# Patient Record
Sex: FEMALE | URBAN - METROPOLITAN AREA
[De-identification: names, ages, dates, MRNs, and addresses within clinical notes are randomized per-mention and may not be internally consistent; named-entity substitution may affect disease eponyms.]

---

## 2024-01-03 ENCOUNTER — DOCUMENTATION (OUTPATIENT)
Dept: HEMATOLOGY ONCOLOGY | Facility: CLINIC | Age: 69
End: 2024-01-03

## 2024-01-03 NOTE — PROGRESS NOTES
Intake received, chart reviewed for need of external records.  Consulting: Dr. Chilel  Scheduled on 01/16/2024  Dx: melanoma   ICD: C43.72    Pathology requested:   From [facility] via fax [phone]-, phone   Accession #   Slides will be sent directly to [facility] Pathology lab at  [address].     Images requested:  From n/a via fax , phone   If not uploaded electronically via [email or web address], disks will be sent directly to the Radiology Reading Room.     Routed to consulting providers team.

## 2024-01-08 ENCOUNTER — LAB REQUISITION (OUTPATIENT)
Dept: LAB | Facility: HOSPITAL | Age: 69
End: 2024-01-08
Payer: COMMERCIAL

## 2024-01-08 DIAGNOSIS — C43.72 MALIGNANT MELANOMA OF LEFT LOWER LIMB, INCLUDING HIP (HCC): ICD-10-CM

## 2024-01-09 PROCEDURE — 88321 CONSLTJ&REPRT SLD PREP ELSWR: CPT | Performed by: STUDENT IN AN ORGANIZED HEALTH CARE EDUCATION/TRAINING PROGRAM

## 2024-01-12 PROBLEM — C43.72 MALIGNANT MELANOMA OF LEFT FOOT (HCC): Status: ACTIVE | Noted: 2024-01-12

## 2024-01-16 ENCOUNTER — CONSULT (OUTPATIENT)
Dept: SURGICAL ONCOLOGY | Facility: CLINIC | Age: 69
End: 2024-01-16
Payer: COMMERCIAL

## 2024-01-16 VITALS
HEIGHT: 65 IN | WEIGHT: 176.5 LBS | TEMPERATURE: 97.4 F | DIASTOLIC BLOOD PRESSURE: 64 MMHG | HEART RATE: 108 BPM | RESPIRATION RATE: 15 BRPM | OXYGEN SATURATION: 97 % | BODY MASS INDEX: 29.41 KG/M2 | SYSTOLIC BLOOD PRESSURE: 116 MMHG

## 2024-01-16 DIAGNOSIS — C43.72 MALIGNANT MELANOMA OF LEFT FOOT (HCC): Primary | ICD-10-CM

## 2024-01-16 PROCEDURE — 99204 OFFICE O/P NEW MOD 45 MIN: CPT | Performed by: STUDENT IN AN ORGANIZED HEALTH CARE EDUCATION/TRAINING PROGRAM

## 2024-01-16 RX ORDER — LOSARTAN POTASSIUM 50 MG/1
50 TABLET ORAL DAILY
COMMUNITY
Start: 2024-01-11

## 2024-01-16 RX ORDER — IBUPROFEN 600 MG/1
TABLET ORAL AS NEEDED
COMMUNITY
Start: 2023-10-19

## 2024-01-16 RX ORDER — ESCITALOPRAM OXALATE 10 MG/1
10 TABLET ORAL DAILY
COMMUNITY
Start: 2024-01-11

## 2024-01-16 RX ORDER — HYDROCHLOROTHIAZIDE 12.5 MG/1
12.5 TABLET ORAL DAILY
COMMUNITY
Start: 2024-01-11

## 2024-01-16 RX ORDER — TRIAMTERENE AND HYDROCHLOROTHIAZIDE 37.5; 25 MG/1; MG/1
CAPSULE ORAL DAILY
COMMUNITY

## 2024-01-16 RX ORDER — SIMVASTATIN 20 MG
20 TABLET ORAL DAILY
COMMUNITY
Start: 2024-01-11

## 2024-01-16 RX ORDER — ZOLPIDEM TARTRATE 12.5 MG/1
12.5 TABLET, FILM COATED, EXTENDED RELEASE ORAL AS NEEDED
COMMUNITY
Start: 2024-01-11

## 2024-01-16 RX ORDER — HYDROCHLOROTHIAZIDE 12.5 MG/1
CAPSULE, GELATIN COATED ORAL DAILY
COMMUNITY

## 2024-01-16 NOTE — PROGRESS NOTES
Surgical Oncology Consultation    [address] [facility]  [facility] [place]  [address] [facility] [address]    Patient:  Lisa Ramirez  1955  [id number]    Primary Care provider:  Ryanne Tariq MD  [address] [facility]  [place] [address]    Referring provider:  No referring provider defined for this encounter.    Diagnoses and all orders for this visit:    Malignant melanoma of left foot (HCC)  -     Case request operating room: WIDE EXCISION OF MELANOMA OF LEFT FOOT, BIOPSY LYMPH NODE SENTINEL; Standing  -     NM lymphatic melanoma; Future  -     CBC and differential; Future  -     Comprehensive metabolic panel; Future  -     EKG 12 lead; Future  -     XR chest pa & lateral; Future  -     Ambulatory Referral to Plastic Surgery; Future  -     Case request operating room: WIDE EXCISION OF MELANOMA OF LEFT FOOT, BIOPSY LYMPH NODE SENTINEL    Other orders  -     tretinoin (Retin-A) 0.025 % cream; Apply topically daily at bedtime  -     simvastatin (ZOCOR) 20 mg tablet; Take 20 mg by mouth daily  -     triamterene-hydrochlorothiazide (DYAZIDE) 37.5-25 mg per capsule; daily Daily in evening  -     zolpidem (AMBIEN CR) 12.5 MG CR tablet; Take 12.5 mg by mouth as needed  -     losartan (COZAAR) 50 mg tablet; Take 50 mg by mouth daily  -     ibuprofen (MOTRIN) 600 mg tablet; as needed  -     hydrochlorothiazide (HYDRODIURIL) 12.5 mg tablet; Take 12.5 mg by mouth daily  -     hydrochlorothiazide (MICROZIDE) 12.5 mg capsule; in the morning  -     escitalopram (LEXAPRO) 10 mg tablet; Take 10 mg by mouth daily  -     Incentive spirometry; Standing  -     Insert and maintain IV line; Standing  -     Void On-Call to O.R.; Standing  -     Place sequential compression device; Standing  -     ceFAZolin (ANCEF) 2,000 mg in dextrose 5 % 100 mL IVPB        Chief Complaint   Patient presents with    Consult       No follow-ups on file.    Oncology History    Malignant melanoma of left foot (HCC)   12/21/2023 Biopsy    A. Skin, left dorsal foot, shave biopsy:     MELANOMA (thickness: at least 0.8 mm); transected (see note).     Synoptic report for melanoma of the skin  Thickness: at least 0.8 mm (invasive melanoma broadly transected at deep specimen margin)  Ulceration: not seen  Mitoses: 2/mm2         1/12/2024 Initial Diagnosis    Malignant melanoma of left foot (HCC)         History of Present Illness  :   New at least T1b of left lateral dorsum of foot. States lesion arose over the last year and was ultimately biopsied by derm. Appeared erythematous and raised and a shave bx was performed revealing an at least T1b sam with positive deep margin. No other lumps or bumps to report. No systemic sx of bone pain, HA, weight loss. No PMH sam or non-sam skin cancer. Fair skin hair and eyes.     Review of Systems  Complete ROS Surg Onc:   Constitutional: The patient denies new or recent history of general fatigue, no recent weight loss, no change in appetite.   Eyes: No complaints of visual problems, no scleral icterus.   ENT: No complaints of ear pain, no hoarseness, no difficulty swallowing,  no tinnitus and no new masses in head, oral cavity, or neck.   Cardiovascular: No complaints of chest pain, no palpitations, no ankle edema.   Respiratory: No complaints of shortness of breath, no cough.   Gastrointestinal: No complaints of jaundice, no bloody stools, no pale stools.   Genitourinary: No complaints of dysuria, no hematuria, no nocturia, no frequent urination, no urethral discharge.   Musculoskeletal: No complaints of weakness, paralysis, joint stiffness or arthralgias.  Integumentary: No complaints of rash, no new lesions.   Neurological: No complaints of convulsions, no seizures, no dizziness.   Hematologic/Lymphatic: No complaints of easy bruising.   Endocrine:  No hot or cold intolerance.  No polydipsia, polyphagia, or polyuria.  Allergy/immunology:  No  environmental allergies.  No food allergies.  Not immunocompromised.      Patient Active Problem List   Diagnosis    Malignant melanoma of left foot (HCC)     Past Medical History:   Diagnosis Date    Anxiety     Hypertension      Past Surgical History:   Procedure Laterality Date    COLONOSCOPY      TONSILLECTOMY      UPPER GASTROINTESTINAL ENDOSCOPY       History reviewed. No pertinent family history.  Social History     Socioeconomic History    Marital status: /Civil Union     Spouse name: Not on file    Number of children: Not on file    Years of education: Not on file    Highest education level: Not on file   Occupational History    Not on file   Tobacco Use    Smoking status: Former     Average packs/day: 1 pack/day for 52.0 years (50.0 ttl pk-yrs)     Types: Cigarettes     Start date: 1970     Quit date:      Years since quittin.0    Smokeless tobacco: Never   Vaping Use    Vaping status: Never Used   Substance and Sexual Activity    Alcohol use: Yes     Alcohol/week: 3.0 standard drinks of alcohol     Types: 3 Glasses of wine per week    Drug use: Never    Sexual activity: Not Currently     Partners: Male     Birth control/protection: Female Sterilization   Other Topics Concern    Not on file   Social History Narrative    Not on file     Social Determinants of Health     Financial Resource Strain: Not At Risk (10/14/2023)    Received from [facility]    Financial Resource Strain     In the last 12 months did you skip medications to save money?: No     In the last 12 months, was there a time when you needed to see a doctor but could not because of cost?: No   Food Insecurity: Not At Risk (10/14/2023)    Received from [facility]    Food Insecurity     In the last 12 months did you ever eat less than you felt you should because there wasn't enough money for food?: No   Transportation Needs: Not At Risk (10/14/2023)    Received  from [facility]    Transporation     In the last 12 months, have you ever had to go without healthcare because you didn't have a way to get there?: No   Physical Activity: Unknown (3/15/2023)    Received from [facility]    Exercise Vital Sign     Days of Exercise per Week: 3 days     Minutes of Exercise per Session: Not on file   Stress: Stress Concern Present (3/15/2023)    Received from [facility]    [employer] - Occupational Stress Questionnaire     Feeling of Stress : To some extent   Social Connections: Not At Risk (10/14/2023)    Received from [facility]    Social Connections     Do you often feel lonely?: No   Intimate Partner Violence: Not on file   Housing Stability: Not At Risk (10/14/2023)    Received from [facility]    Housing Stability     Are you worried that in the next 2 months you may not have stable housing?: No       Current Outpatient Medications:     escitalopram (LEXAPRO) 10 mg tablet, Take 10 mg by mouth daily, Disp: , Rfl:     hydrochlorothiazide (HYDRODIURIL) 12.5 mg tablet, Take 12.5 mg by mouth daily, Disp: , Rfl:     hydrochlorothiazide (MICROZIDE) 12.5 mg capsule, in the morning, Disp: , Rfl:     ibuprofen (MOTRIN) 600 mg tablet, as needed, Disp: , Rfl:     losartan (COZAAR) 50 mg tablet, Take 50 mg by mouth daily, Disp: , Rfl:     simvastatin (ZOCOR) 20 mg tablet, Take 20 mg by mouth daily, Disp: , Rfl:     tretinoin (Retin-A) 0.025 % cream, Apply topically daily at bedtime, Disp: , Rfl:     triamterene-hydrochlorothiazide (DYAZIDE) 37.5-25 mg per capsule, daily Daily in evening, Disp: , Rfl:     zolpidem (AMBIEN CR) 12.5 MG CR tablet, Take 12.5 mg by mouth as needed, Disp: , Rfl:   No Known Allergies    Vitals:    01/16/24 1101   BP: 116/64   Pulse: (!) 108   Resp: 15   Temp: (!) 97.4 °F (36.3 °C)   SpO2: 97%       Physical Exam    General: Appears well, appears stated age  Skin: Warm, anicteric. LFT foot dorsal scab about 8 mm in size  HEENT: Normocephalic, atraumatic; sclera aniceteric, mucous membranes moist; cervical nodes without adenopathy  Cardiopulmonary: RRR, Easy WOB, no BLE edema  Abd: Flat and soft, nontender, no masses appreciated, no hepatosplenomegaly  MSK: Symmetric, no cyanosis, no overt weakness  Lymphatic: No cervical, axillary or inguinal lymphadenopathy  Neuro: Affect appropriate, no gross motor abnormalities    Pathology:  Final Diagnosis   Consult case (9 slides [id number]  [employer], collected 12/21/2023):     A. Skin, left dorsal foot, shave biopsy:     MELANOMA (thickness: at least 0.8 mm); transected (see note).     Note: The provided immunostains were reviewed; the lesional cells are positive for SOX10, Melan-A, and PRAME. Please see synoptic report for additional details.     Synoptic report for melanoma of the skin  Thickness: at least 0.8 mm (invasive melanoma broadly transected at deep specimen margin)  Ulceration: not seen  Anatomic (Castillo) level: at least IV  Type: cannot be determined  Mitoses: 2/mm2  Microsatellites: cannot be determined   Lymphovascular invasion: not seen  Neurotropism: not seen  Tumor regression: not seen  Tumor-infiltrating lymphocytes (TIL): present, non-brisk  Margin assessment: invasive melanoma extends to peripheral and deep specimen margin  Pathologic stage: at least pT1b  Associated nevus: not seen       Labs: Reviewed in EPIC    Imaging  No results found.    I independently reviewed and interpreted the above laboratory and imaging data incl derm notes, path referral      Discussion/Summary:   67 yo female with new diagnosis of at least T1b melanoma of the left foot dorsal skin. Discussed diagnosis of melanoma and explained that the patient has an unknown thickness melanoma due to the positive deep margin for which a wide local excision and sentinel lymph node biopsy  is recommended. Discussed that any additional therapy will be guided by the final pathology and results of the LN bx. Discussed that sun protection is best prevention for melanoma and discussed ongoing sun protection. Reinforced need for continued skin surveillance with dermatology, which the patient plans to continue. Risks, benefits, alternatives and prognosis discussed in full to include bleeding, infection, wound healing complications, need for re-excision, seroma, and pt expresses understanding and endorsement. Will proceed with WLE + SLN bx.

## 2024-01-17 ENCOUNTER — APPOINTMENT (OUTPATIENT)
Dept: LAB | Facility: HOSPITAL | Age: 69
End: 2024-01-17
Payer: COMMERCIAL

## 2024-01-17 ENCOUNTER — TELEPHONE (OUTPATIENT)
Dept: SURGICAL ONCOLOGY | Facility: CLINIC | Age: 69
End: 2024-01-17

## 2024-01-17 ENCOUNTER — HOSPITAL ENCOUNTER (OUTPATIENT)
Dept: RADIOLOGY | Facility: HOSPITAL | Age: 69
Discharge: HOME | End: 2024-01-17
Payer: COMMERCIAL

## 2024-01-17 DIAGNOSIS — C43.72 MALIGNANT MELANOMA OF LEFT FOOT (HCC): ICD-10-CM

## 2024-01-17 LAB
ALBUMIN SERPL BCP-MCNC: 4.1 G/DL (ref 3.5–5)
ALP SERPL-CCNC: 56 U/L (ref 34–104)
ALT SERPL W P-5'-P-CCNC: 12 U/L (ref 7–52)
ANION GAP SERPL CALCULATED.3IONS-SCNC: 9 MMOL/L
AST SERPL W P-5'-P-CCNC: 16 U/L (ref 13–39)
ATRIAL RATE: 96 BPM
BASOPHILS # BLD AUTO: 0.06 THOUSANDS/ÂΜL (ref 0–0.1)
BASOPHILS NFR BLD AUTO: 1 % (ref 0–1)
BILIRUB SERPL-MCNC: 0.31 MG/DL (ref 0.2–1)
BUN SERPL-MCNC: 13 MG/DL (ref 5–25)
CALCIUM SERPL-MCNC: 9 MG/DL (ref 8.4–10.2)
CHLORIDE SERPL-SCNC: 98 MMOL/L (ref 96–108)
CO2 SERPL-SCNC: 28 MMOL/L (ref 21–32)
CREAT SERPL-MCNC: 0.93 MG/DL (ref 0.6–1.3)
EOSINOPHIL # BLD AUTO: 0.1 THOUSAND/ÂΜL (ref 0–0.61)
EOSINOPHIL NFR BLD AUTO: 2 % (ref 0–6)
ERYTHROCYTE [DISTWIDTH] IN BLOOD BY AUTOMATED COUNT: 15 % (ref 11.6–15.1)
GFR SERPL CREATININE-BSD FRML MDRD: 63 ML/MIN/1.73SQ M
GLUCOSE P FAST SERPL-MCNC: 96 MG/DL (ref 65–99)
HCT VFR BLD AUTO: 35.1 % (ref 34.8–46.1)
HGB BLD-MCNC: 10.6 G/DL (ref 11.5–15.4)
IMM GRANULOCYTES # BLD AUTO: 0.03 THOUSAND/UL (ref 0–0.2)
IMM GRANULOCYTES NFR BLD AUTO: 0 % (ref 0–2)
LYMPHOCYTES # BLD AUTO: 2.27 THOUSANDS/ÂΜL (ref 0.6–4.47)
LYMPHOCYTES NFR BLD AUTO: 34 % (ref 14–44)
MCH RBC QN AUTO: 25.3 PG (ref 26.8–34.3)
MCHC RBC AUTO-ENTMCNC: 30.2 G/DL (ref 31.4–37.4)
MCV RBC AUTO: 84 FL (ref 82–98)
MONOCYTES # BLD AUTO: 0.54 THOUSAND/ÂΜL (ref 0.17–1.22)
MONOCYTES NFR BLD AUTO: 8 % (ref 4–12)
NEUTROPHILS # BLD AUTO: 3.78 THOUSANDS/ÂΜL (ref 1.85–7.62)
NEUTS SEG NFR BLD AUTO: 55 % (ref 43–75)
NRBC BLD AUTO-RTO: 0 /100 WBCS
P AXIS: 0 DEGREES
PLATELET # BLD AUTO: 360 THOUSANDS/UL (ref 149–390)
PMV BLD AUTO: 10 FL (ref 8.9–12.7)
POTASSIUM SERPL-SCNC: 3.2 MMOL/L (ref 3.5–5.3)
PR INTERVAL: 158 MS
PROT SERPL-MCNC: 7.2 G/DL (ref 6.4–8.4)
QRS AXIS: 17 DEGREES
QRSD INTERVAL: 86 MS
QT INTERVAL: 358 MS
QTC INTERVAL: 452 MS
RBC # BLD AUTO: 4.19 MILLION/UL (ref 3.81–5.12)
SODIUM SERPL-SCNC: 135 MMOL/L (ref 135–147)
T WAVE AXIS: 22 DEGREES
VENTRICULAR RATE: 96 BPM
WBC # BLD AUTO: 6.78 THOUSAND/UL (ref 4.31–10.16)

## 2024-01-17 PROCEDURE — 71046 X-RAY EXAM CHEST 2 VIEWS: CPT

## 2024-01-17 PROCEDURE — 80053 COMPREHEN METABOLIC PANEL: CPT

## 2024-01-17 PROCEDURE — 36415 COLL VENOUS BLD VENIPUNCTURE: CPT

## 2024-01-17 PROCEDURE — 85025 COMPLETE CBC W/AUTO DIFF WBC: CPT

## 2024-01-17 NOTE — TELEPHONE ENCOUNTER
Called and spoke with patient in regards to her surgery date. Patient excepted a surgery date of 2/23/24 at the [facility] location with Dr Chilel and Dr Sutton. Patient has no further questions at this time.

## 2024-01-29 ENCOUNTER — CONSULT (OUTPATIENT)
Dept: PLASTIC SURGERY | Facility: CLINIC | Age: 69
End: 2024-01-29
Payer: COMMERCIAL

## 2024-01-29 VITALS
HEIGHT: 65 IN | BODY MASS INDEX: 29.32 KG/M2 | HEART RATE: 108 BPM | SYSTOLIC BLOOD PRESSURE: 119 MMHG | DIASTOLIC BLOOD PRESSURE: 86 MMHG | TEMPERATURE: 98.1 F | WEIGHT: 176 LBS

## 2024-01-29 DIAGNOSIS — C43.72 MALIGNANT MELANOMA OF LEFT FOOT (HCC): Primary | ICD-10-CM

## 2024-01-29 PROCEDURE — 99204 OFFICE O/P NEW MOD 45 MIN: CPT | Performed by: STUDENT IN AN ORGANIZED HEALTH CARE EDUCATION/TRAINING PROGRAM

## 2024-01-30 ENCOUNTER — TELEPHONE (OUTPATIENT)
Dept: PLASTIC SURGERY | Facility: CLINIC | Age: 69
End: 2024-01-30

## 2024-01-30 NOTE — TELEPHONE ENCOUNTER
Left message for patient letting her know that her surgery is all scheduled and left my direct number to call me if she has any questions.

## 2024-01-30 NOTE — PROGRESS NOTES
Plastic Surgery Consult    Reason for visit: left dorsal foot melanoma scheduled for WLE    HPI from 24  Patient is a 67 y/o female who presents with biopsy proven melanoma (at least 0.8 mm thick from shave biopsy) of the left lateral dorsal foot. She is planned for WLE and SLNbx with Dr Chilel. She presents today for surgical reconstructive options and treatment.    ROS: 12 pt ROS negative, except as otherwise noted in HPI    Past Medical History:   Diagnosis Date    Anxiety     Hypertension        FamHx: non-contrib  Past Surgical History:   Procedure Laterality Date    COLONOSCOPY      TONSILLECTOMY      UPPER GASTROINTESTINAL ENDOSCOPY         Social History     Socioeconomic History    Marital status: /Civil Union     Spouse name: Not on file    Number of children: Not on file    Years of education: Not on file    Highest education level: Not on file   Occupational History    Not on file   Tobacco Use    Smoking status: Former     Average packs/day: 2.0 packs/day for 25.0 years (50.0 ttl pk-yrs)     Types: Cigarettes     Start date: 1970     Quit date:      Years since quittin.1    Smokeless tobacco: Never   Vaping Use    Vaping status: Never Used   Substance and Sexual Activity    Alcohol use: Yes     Alcohol/week: 3.0 standard drinks of alcohol     Types: 3 Glasses of wine per week    Drug use: Never    Sexual activity: Not Currently     Partners: Male     Birth control/protection: Female Sterilization   Other Topics Concern    Not on file   Social History Narrative    Not on file     Social Determinants of Health     Financial Resource Strain: Not At Risk (10/14/2023)    Received from [facility]    Financial Resource Strain     In the last 12 months did you skip medications to save money?: No     In the last 12 months, was there a time when you needed to see a doctor but could not because of cost?: No   Food Insecurity: Not At Risk (10/14/2023)     Received from [facility]    Food Insecurity     In the last 12 months did you ever eat less than you felt you should because there wasn't enough money for food?: No   Transportation Needs: Not At Risk (10/14/2023)    Received from [facility]    Transporation     In the last 12 months, have you ever had to go without healthcare because you didn't have a way to get there?: No   Physical Activity: Unknown (3/15/2023)    Received from [facility]    Exercise Vital Sign     Days of Exercise per Week: 3 days     Minutes of Exercise per Session: Not on file   Stress: Stress Concern Present (3/15/2023)    Received from [facility]    [employer] [place] [employer] - Occupational Stress Questionnaire     Feeling of Stress : To some extent   Social Connections: Not At Risk (10/14/2023)    Received from [facility]    Social Connections     Do you often feel lonely?: No   Intimate Partner Violence: Not on file   Housing Stability: Not At Risk (10/14/2023)    Received from [facility]    Housing Stability     Are you worried that in the next 2 months you may not have stable housing?: No       Current Outpatient Medications on File Prior to Visit   Medication Sig Dispense Refill    escitalopram (LEXAPRO) 10 mg tablet Take 10 mg by mouth daily      hydrochlorothiazide (HYDRODIURIL) 12.5 mg tablet Take 12.5 mg by mouth daily      hydrochlorothiazide (MICROZIDE) 12.5 mg capsule in the morning      ibuprofen (MOTRIN) 600 mg tablet as needed      losartan (COZAAR) 50 mg tablet Take 50 mg by mouth daily      metFORMIN HCl  MG/5ML SRER       simvastatin (ZOCOR) 20 mg tablet Take 20 mg by mouth daily      tretinoin (Retin-A) 0.025 % cream Apply topically daily at bedtime      triamterene-hydrochlorothiazide (DYAZIDE) 37.5-25 mg per capsule daily Daily in evening       zolpidem (AMBIEN CR) 12.5 MG CR tablet Take 12.5 mg by mouth as needed       No current facility-administered medications on file prior to visit.       No Known Allergies      PE:    Vitals:    01/29/24 1500   BP: 119/86   Pulse: (!) 108   Temp: 98.1 °F (36.7 °C)       General: NC/AT, breathing comfortably on RA  Neuro: CN II-XII grossly intact, symmetric reflexes  HEENT: PERRLA, EOMI, external ears normal, no lesions or deformities, neck supple, trachea midline  Respiratory: CTAB, normal respiratory effort  Cardio: RRR, normal S1, S2, no murmur, rubs, gallops  GI: soft, non-tender, non-distended  Extremities/MSK: normal alignment, mobility, gait, no edema    Left lateral dorsal foot melanocytic lesion with asymmetric borders    Biopsy: melanoma (at least 0.8 mm) from shave biopsy    A/P: 67 y/o female who presents with left lateral dorsal foot melanoma with plan for WLE and SLNbx.  -Discussed the various methods of reconstruction, most likely being split-thickness skin graft with VAC bolster vs manual tie-over bolster. I discussed that the split-thickness skin graft will be obtained from the left thigh. I discussed the donor site maintenance and postoperative dressing. I discussed that the skin graft would be bolstered either with VAC or tie-over bolster depending on the size of the defect. More over, the area of resection is overlying the extensor tendons. I discussed with patient that if peritenon is intact, that I can proceed with skin graft. However, if peritenon is resected as well, then I would place dermal substitute integra bolstered by VAC. In the even that integra is utilized, the VAC would be changed weekly, and skin graft would be delayed for 3-4 weeks. Patient acknowledged.  -Ultimately, the method of reconstruction will depend on the size, depth, extent of the resection. Patient acknowledged.  -Discussed postoperative need for crutches and non-weight bearing on the left foot due to the possibility of  shear given the location just overlying extensor tendons. Patient acknowledged.  -All questions answered, concerns addressed.  -Consent obtained, will coordinate with Dr Chilel  -Spent 45 minutes in consultation with patient. Greater than 50% of the total time was spent obtaining history, evaluation, performing exam, discussion of management options including post-operative care, answering patient's questions and concerns, chart reviewing, and documentation    Efren Sutton MD   [facility]   [address] [place] [address], [address]   [place] [address]   Office: [phone]

## 2024-01-30 NOTE — H&P (VIEW-ONLY)
Plastic Surgery Consult    Reason for visit: left dorsal foot melanoma scheduled for WLE    HPI from 24  Patient is a 67 y/o female who presents with biopsy proven melanoma (at least 0.8 mm thick from shave biopsy) of the left lateral dorsal foot. She is planned for WLE and SLNbx with Dr Chilel. She presents today for surgical reconstructive options and treatment.    ROS: 12 pt ROS negative, except as otherwise noted in HPI    Past Medical History:   Diagnosis Date    Anxiety     Hypertension        FamHx: non-contrib  Past Surgical History:   Procedure Laterality Date    COLONOSCOPY      TONSILLECTOMY      UPPER GASTROINTESTINAL ENDOSCOPY         Social History     Socioeconomic History    Marital status: /Civil Union     Spouse name: Not on file    Number of children: Not on file    Years of education: Not on file    Highest education level: Not on file   Occupational History    Not on file   Tobacco Use    Smoking status: Former     Average packs/day: 2.0 packs/day for 25.0 years (50.0 ttl pk-yrs)     Types: Cigarettes     Start date: 1970     Quit date:      Years since quittin.1    Smokeless tobacco: Never   Vaping Use    Vaping status: Never Used   Substance and Sexual Activity    Alcohol use: Yes     Alcohol/week: 3.0 standard drinks of alcohol     Types: 3 Glasses of wine per week    Drug use: Never    Sexual activity: Not Currently     Partners: Male     Birth control/protection: Female Sterilization   Other Topics Concern    Not on file   Social History Narrative    Not on file     Social Determinants of Health     Financial Resource Strain: Not At Risk (10/14/2023)    Received from [facility]    Financial Resource Strain     In the last 12 months did you skip medications to save money?: No     In the last 12 months, was there a time when you needed to see a doctor but could not because of cost?: No   Food Insecurity: Not At Risk (10/14/2023)     Received from [facility]    Food Insecurity     In the last 12 months did you ever eat less than you felt you should because there wasn't enough money for food?: No   Transportation Needs: Not At Risk (10/14/2023)    Received from [facility]    Transporation     In the last 12 months, have you ever had to go without healthcare because you didn't have a way to get there?: No   Physical Activity: Unknown (3/15/2023)    Received from [facility]    Exercise Vital Sign     Days of Exercise per Week: 3 days     Minutes of Exercise per Session: Not on file   Stress: Stress Concern Present (3/15/2023)    Received from [facility]    [employer] - Occupational Stress Questionnaire     Feeling of Stress : To some extent   Social Connections: Not At Risk (10/14/2023)    Received from [facility]    Social Connections     Do you often feel lonely?: No   Intimate Partner Violence: Not on file   Housing Stability: Not At Risk (10/14/2023)    Received from [facility]    Housing Stability     Are you worried that in the next 2 months you may not have stable housing?: No       Current Outpatient Medications on File Prior to Visit   Medication Sig Dispense Refill    escitalopram (LEXAPRO) 10 mg tablet Take 10 mg by mouth daily      hydrochlorothiazide (HYDRODIURIL) 12.5 mg tablet Take 12.5 mg by mouth daily      hydrochlorothiazide (MICROZIDE) 12.5 mg capsule in the morning      ibuprofen (MOTRIN) 600 mg tablet as needed      losartan (COZAAR) 50 mg tablet Take 50 mg by mouth daily      metFORMIN HCl  MG/5ML SRER       simvastatin (ZOCOR) 20 mg tablet Take 20 mg by mouth daily      tretinoin (Retin-A) 0.025 % cream Apply topically daily at bedtime      triamterene-hydrochlorothiazide (DYAZIDE) 37.5-25 mg per capsule daily Daily in evening       zolpidem (AMBIEN CR) 12.5 MG CR tablet Take 12.5 mg by mouth as needed       No current facility-administered medications on file prior to visit.       No Known Allergies      PE:    Vitals:    01/29/24 1500   BP: 119/86   Pulse: (!) 108   Temp: 98.1 °F (36.7 °C)       General: NC/AT, breathing comfortably on RA  Neuro: CN II-XII grossly intact, symmetric reflexes  HEENT: PERRLA, EOMI, external ears normal, no lesions or deformities, neck supple, trachea midline  Respiratory: CTAB, normal respiratory effort  Cardio: RRR, normal S1, S2, no murmur, rubs, gallops  GI: soft, non-tender, non-distended  Extremities/MSK: normal alignment, mobility, gait, no edema    Left lateral dorsal foot melanocytic lesion with asymmetric borders    Biopsy: melanoma (at least 0.8 mm) from shave biopsy    A/P: 69 y/o female who presents with left lateral dorsal foot melanoma with plan for WLE and SLNbx.  -Discussed the various methods of reconstruction, most likely being split-thickness skin graft with VAC bolster vs manual tie-over bolster. I discussed that the split-thickness skin graft will be obtained from the left thigh. I discussed the donor site maintenance and postoperative dressing. I discussed that the skin graft would be bolstered either with VAC or tie-over bolster depending on the size of the defect. More over, the area of resection is overlying the extensor tendons. I discussed with patient that if peritenon is intact, that I can proceed with skin graft. However, if peritenon is resected as well, then I would place dermal substitute integra bolstered by VAC. In the even that integra is utilized, the VAC would be changed weekly, and skin graft would be delayed for 3-4 weeks. Patient acknowledged.  -Ultimately, the method of reconstruction will depend on the size, depth, extent of the resection. Patient acknowledged.  -Discussed postoperative need for crutches and non-weight bearing on the left foot due to the possibility of  shear given the location just overlying extensor tendons. Patient acknowledged.  -All questions answered, concerns addressed.  -Consent obtained, will coordinate with Dr Chilel  -Spent 45 minutes in consultation with patient. Greater than 50% of the total time was spent obtaining history, evaluation, performing exam, discussion of management options including post-operative care, answering patient's questions and concerns, chart reviewing, and documentation    Efren Sutton MD   [facility]astic [facility]constructive [facility]   [address] [place] [address], [address]   [place] [address]   Office: [phone]

## 2024-02-13 ENCOUNTER — ANESTHESIA EVENT (OUTPATIENT)
Dept: PERIOP | Facility: HOSPITAL | Age: 69
End: 2024-02-13
Payer: COMMERCIAL

## 2024-02-16 LAB
DME PARACHUTE DELIVERY DATE REQUESTED: NORMAL
DME PARACHUTE ITEM DESCRIPTION: NORMAL
DME PARACHUTE ORDER STATUS: NORMAL
DME PARACHUTE SUPPLIER NAME: NORMAL
DME PARACHUTE SUPPLIER PHONE: NORMAL

## 2024-02-16 NOTE — PRE-PROCEDURE INSTRUCTIONS
Pre-Surgery Instructions:   Medication Instructions    escitalopram (LEXAPRO) 10 mg tablet Take day of surgery.    hydrochlorothiazide (HYDRODIURIL) 12.5 mg tablet Take night before surgery    ibuprofen (MOTRIN) 600 mg tablet Stop taking 7 days prior to surgery.    losartan (COZAAR) 50 mg tablet Take night before surgery    metFORMIN HCl  MG/5ML SRER Hold day of surgery.    Multiple Vitamins-Minerals (Multivitamin Adults) TABS Stop taking 7 days prior to surgery.    simvastatin (ZOCOR) 20 mg tablet Take day of surgery.    tretinoin (Retin-A) 0.025 % cream Hold day of surgery.    zolpidem (AMBIEN CR) 12.5 MG CR tablet PRN HS     Spoke with pt via phone.    Medication instructions for day surgery reviewed. Please use only a sip of water to take your instructed medications. Avoid all over the counter vitamins, supplements and NSAIDS for one week prior to surgery per anesthesia guidelines. Tylenol is ok to take as needed.     You will receive a call one business day prior to surgery with an arrival time and hospital directions. If your surgery is scheduled on a Monday, the hospital will be calling you on the Friday prior to your surgery. If you have not heard from anyone by 8pm, please call the hospital supervisor through the hospital  at [phone]. ([place] [phone] or [place] [phone]).    Do not eat or drink anything after midnight the night before your surgery, including candy, mints, lifesavers, or chewing gum. Do not drink alcohol 24hrs before your surgery. Try not to smoke at least 24hrs before your surgery.       Follow the pre surgery showering instructions as listed in the “My Surgical Experience Booklet” or otherwise provided by your surgeon's office. Do not use a blade to shave the surgical area 1 week before surgery. It is okay to use a clean electric clippers up to 24 hours before surgery. Do not apply any lotions, creams, including makeup, cologne, deodorant, or perfumes after  showering on the day of your surgery. Do not use dry shampoo, hair spray, hair gel, or any type of hair products.     No contact lenses, eye make-up, or artificial eyelashes. Remove nail polish, including gel polish, and any artificial, gel, or acrylic nails if possible. Remove all jewelry including rings and body piercing jewelry.     Wear causal clothing that is easy to take on and off. Consider your type of surgery.    Keep any valuables, jewelry, piercings at home. Please bring any specially ordered equipment (sling, braces) if indicated.    Arrange for a responsible person to drive you to and from the hospital on the day of your surgery. Visitor Guidelines discussed.     Call the surgeon's office with any new illnesses, exposures, or additional questions prior to surgery.    Please reference your “My Surgical Experience Booklet” for additional information to prepare for your upcoming surgery.

## 2024-02-23 ENCOUNTER — HOSPITAL ENCOUNTER (OUTPATIENT)
Dept: NUCLEAR MEDICINE | Facility: HOSPITAL | Age: 69
End: 2024-02-23
Attending: STUDENT IN AN ORGANIZED HEALTH CARE EDUCATION/TRAINING PROGRAM
Payer: COMMERCIAL

## 2024-02-23 ENCOUNTER — HOSPITAL ENCOUNTER (OUTPATIENT)
Facility: HOSPITAL | Age: 69
Setting detail: OUTPATIENT SURGERY
Discharge: HOME | End: 2024-02-23
Attending: STUDENT IN AN ORGANIZED HEALTH CARE EDUCATION/TRAINING PROGRAM | Admitting: STUDENT IN AN ORGANIZED HEALTH CARE EDUCATION/TRAINING PROGRAM
Payer: COMMERCIAL

## 2024-02-23 ENCOUNTER — ANESTHESIA (OUTPATIENT)
Dept: PERIOP | Facility: HOSPITAL | Age: 69
End: 2024-02-23
Payer: COMMERCIAL

## 2024-02-23 VITALS
SYSTOLIC BLOOD PRESSURE: 106 MMHG | BODY MASS INDEX: 29.46 KG/M2 | HEIGHT: 65 IN | WEIGHT: 176.8 LBS | DIASTOLIC BLOOD PRESSURE: 55 MMHG | HEART RATE: 104 BPM | TEMPERATURE: 98.5 F | OXYGEN SATURATION: 95 % | RESPIRATION RATE: 15 BRPM

## 2024-02-23 DIAGNOSIS — C43.72 MALIGNANT MELANOMA OF LEFT FOOT (HCC): ICD-10-CM

## 2024-02-23 PROBLEM — E11.9 DIABETES MELLITUS, TYPE 2 (HCC): Status: ACTIVE | Noted: 2024-02-23

## 2024-02-23 PROBLEM — G47.30 SLEEP APNEA: Status: ACTIVE | Noted: 2024-02-23

## 2024-02-23 PROBLEM — E78.5 HYPERLIPIDEMIA: Status: ACTIVE | Noted: 2024-02-23

## 2024-02-23 PROBLEM — I10 HYPERTENSION: Status: ACTIVE | Noted: 2024-02-23

## 2024-02-23 LAB — GLUCOSE SERPL-MCNC: 117 MG/DL (ref 65–140)

## 2024-02-23 PROCEDURE — 11624 EXC S/N/H/F/G MAL+MRG 3.1-4: CPT | Performed by: STUDENT IN AN ORGANIZED HEALTH CARE EDUCATION/TRAINING PROGRAM

## 2024-02-23 PROCEDURE — 78195 LYMPH SYSTEM IMAGING: CPT

## 2024-02-23 PROCEDURE — 88341 IMHCHEM/IMCYTCHM EA ADD ANTB: CPT | Performed by: STUDENT IN AN ORGANIZED HEALTH CARE EDUCATION/TRAINING PROGRAM

## 2024-02-23 PROCEDURE — 38900 IO MAP OF SENT LYMPH NODE: CPT | Performed by: STUDENT IN AN ORGANIZED HEALTH CARE EDUCATION/TRAINING PROGRAM

## 2024-02-23 PROCEDURE — NC001 PR NO CHARGE: Performed by: PHYSICIAN ASSISTANT

## 2024-02-23 PROCEDURE — 82948 REAGENT STRIP/BLOOD GLUCOSE: CPT

## 2024-02-23 PROCEDURE — 38900 IO MAP OF SENT LYMPH NODE: CPT | Performed by: PHYSICIAN ASSISTANT

## 2024-02-23 PROCEDURE — 11624 EXC S/N/H/F/G MAL+MRG 3.1-4: CPT | Performed by: PHYSICIAN ASSISTANT

## 2024-02-23 PROCEDURE — 15120 SPLT AGRFT F/S/N/H/F/G/M 1ST: CPT | Performed by: STUDENT IN AN ORGANIZED HEALTH CARE EDUCATION/TRAINING PROGRAM

## 2024-02-23 PROCEDURE — 38500 BIOPSY/REMOVAL LYMPH NODES: CPT | Performed by: PHYSICIAN ASSISTANT

## 2024-02-23 PROCEDURE — G1004 CDSM NDSC: HCPCS

## 2024-02-23 PROCEDURE — 88307 TISSUE EXAM BY PATHOLOGIST: CPT | Performed by: STUDENT IN AN ORGANIZED HEALTH CARE EDUCATION/TRAINING PROGRAM

## 2024-02-23 PROCEDURE — 88305 TISSUE EXAM BY PATHOLOGIST: CPT | Performed by: STUDENT IN AN ORGANIZED HEALTH CARE EDUCATION/TRAINING PROGRAM

## 2024-02-23 PROCEDURE — 88342 IMHCHEM/IMCYTCHM 1ST ANTB: CPT | Performed by: STUDENT IN AN ORGANIZED HEALTH CARE EDUCATION/TRAINING PROGRAM

## 2024-02-23 PROCEDURE — 38500 BIOPSY/REMOVAL LYMPH NODES: CPT | Performed by: STUDENT IN AN ORGANIZED HEALTH CARE EDUCATION/TRAINING PROGRAM

## 2024-02-23 PROCEDURE — A9541 TC99M SULFUR COLLOID: HCPCS

## 2024-02-23 RX ORDER — LIDOCAINE HYDROCHLORIDE AND EPINEPHRINE 10; 10 MG/ML; UG/ML
INJECTION, SOLUTION INFILTRATION; PERINEURAL AS NEEDED
Status: DISCONTINUED | OUTPATIENT
Start: 2024-02-23 | End: 2024-02-23 | Stop reason: HOSPADM

## 2024-02-23 RX ORDER — TRAMADOL HYDROCHLORIDE 50 MG/1
50 TABLET ORAL EVERY 6 HOURS PRN
Qty: 18 TABLET | Refills: 0 | Status: SHIPPED | OUTPATIENT
Start: 2024-02-23

## 2024-02-23 RX ORDER — ALBUTEROL SULFATE 2.5 MG/3ML
2.5 SOLUTION RESPIRATORY (INHALATION) ONCE AS NEEDED
Status: DISCONTINUED | OUTPATIENT
Start: 2024-02-23 | End: 2024-02-23 | Stop reason: HOSPADM

## 2024-02-23 RX ORDER — DEXAMETHASONE SODIUM PHOSPHATE 10 MG/ML
INJECTION, SOLUTION INTRAMUSCULAR; INTRAVENOUS AS NEEDED
Status: DISCONTINUED | OUTPATIENT
Start: 2024-02-23 | End: 2024-02-23

## 2024-02-23 RX ORDER — LIDOCAINE HYDROCHLORIDE 10 MG/ML
0.5 INJECTION, SOLUTION EPIDURAL; INFILTRATION; INTRACAUDAL; PERINEURAL ONCE AS NEEDED
Status: DISCONTINUED | OUTPATIENT
Start: 2024-02-23 | End: 2024-02-23 | Stop reason: HOSPADM

## 2024-02-23 RX ORDER — ONDANSETRON 2 MG/ML
INJECTION INTRAMUSCULAR; INTRAVENOUS AS NEEDED
Status: DISCONTINUED | OUTPATIENT
Start: 2024-02-23 | End: 2024-02-23

## 2024-02-23 RX ORDER — SODIUM CHLORIDE, SODIUM LACTATE, POTASSIUM CHLORIDE, CALCIUM CHLORIDE 600; 310; 30; 20 MG/100ML; MG/100ML; MG/100ML; MG/100ML
125 INJECTION, SOLUTION INTRAVENOUS CONTINUOUS
Status: DISCONTINUED | OUTPATIENT
Start: 2024-02-23 | End: 2024-02-23 | Stop reason: HOSPADM

## 2024-02-23 RX ORDER — TRAMADOL HYDROCHLORIDE 50 MG/1
50 TABLET ORAL EVERY 6 HOURS PRN
Status: DISCONTINUED | OUTPATIENT
Start: 2024-02-23 | End: 2024-02-23 | Stop reason: HOSPADM

## 2024-02-23 RX ORDER — ONDANSETRON 2 MG/ML
4 INJECTION INTRAMUSCULAR; INTRAVENOUS ONCE AS NEEDED
Status: DISCONTINUED | OUTPATIENT
Start: 2024-02-23 | End: 2024-02-23 | Stop reason: HOSPADM

## 2024-02-23 RX ORDER — HYDROMORPHONE HCL IN WATER/PF 6 MG/30 ML
0.2 PATIENT CONTROLLED ANALGESIA SYRINGE INTRAVENOUS
Status: DISCONTINUED | OUTPATIENT
Start: 2024-02-23 | End: 2024-02-23 | Stop reason: HOSPADM

## 2024-02-23 RX ORDER — FENTANYL CITRATE 50 UG/ML
INJECTION, SOLUTION INTRAMUSCULAR; INTRAVENOUS AS NEEDED
Status: DISCONTINUED | OUTPATIENT
Start: 2024-02-23 | End: 2024-02-23

## 2024-02-23 RX ORDER — VASOPRESSIN 20 U/ML
INJECTION PARENTERAL AS NEEDED
Status: DISCONTINUED | OUTPATIENT
Start: 2024-02-23 | End: 2024-02-23

## 2024-02-23 RX ORDER — CEFAZOLIN SODIUM 2 G/50ML
2000 SOLUTION INTRAVENOUS ONCE
Status: COMPLETED | OUTPATIENT
Start: 2024-02-23 | End: 2024-02-23

## 2024-02-23 RX ORDER — FENTANYL CITRATE/PF 50 MCG/ML
25 SYRINGE (ML) INJECTION
Status: DISCONTINUED | OUTPATIENT
Start: 2024-02-23 | End: 2024-02-23 | Stop reason: HOSPADM

## 2024-02-23 RX ORDER — LIDOCAINE HYDROCHLORIDE 10 MG/ML
INJECTION, SOLUTION EPIDURAL; INFILTRATION; INTRACAUDAL; PERINEURAL AS NEEDED
Status: DISCONTINUED | OUTPATIENT
Start: 2024-02-23 | End: 2024-02-23

## 2024-02-23 RX ORDER — GABAPENTIN 300 MG/1
300 CAPSULE ORAL ONCE
Status: COMPLETED | OUTPATIENT
Start: 2024-02-23 | End: 2024-02-23

## 2024-02-23 RX ORDER — ACETAMINOPHEN 325 MG/1
975 TABLET ORAL ONCE
Status: COMPLETED | OUTPATIENT
Start: 2024-02-23 | End: 2024-02-23

## 2024-02-23 RX ORDER — PHENYLEPHRINE HCL IN 0.9% NACL 1 MG/10 ML
SYRINGE (ML) INTRAVENOUS AS NEEDED
Status: DISCONTINUED | OUTPATIENT
Start: 2024-02-23 | End: 2024-02-23

## 2024-02-23 RX ORDER — PROPOFOL 10 MG/ML
INJECTION, EMULSION INTRAVENOUS AS NEEDED
Status: DISCONTINUED | OUTPATIENT
Start: 2024-02-23 | End: 2024-02-23

## 2024-02-23 RX ADMIN — PROPOFOL 200 MG: 10 INJECTION, EMULSION INTRAVENOUS at 09:10

## 2024-02-23 RX ADMIN — Medication 300 MCG: at 10:03

## 2024-02-23 RX ADMIN — DEXAMETHASONE SODIUM PHOSPHATE 10 MG: 10 INJECTION, SOLUTION INTRAMUSCULAR; INTRAVENOUS at 09:10

## 2024-02-23 RX ADMIN — VASOPRESSIN 2 UNITS: 20 INJECTION INTRAVENOUS at 10:05

## 2024-02-23 RX ADMIN — SODIUM CHLORIDE, SODIUM LACTATE, POTASSIUM CHLORIDE, AND CALCIUM CHLORIDE: .6; .31; .03; .02 INJECTION, SOLUTION INTRAVENOUS at 07:35

## 2024-02-23 RX ADMIN — ACETAMINOPHEN 975 MG: 325 TABLET, FILM COATED ORAL at 06:45

## 2024-02-23 RX ADMIN — Medication 300 MCG: at 09:52

## 2024-02-23 RX ADMIN — VASOPRESSIN 1 UNITS: 20 INJECTION INTRAVENOUS at 10:40

## 2024-02-23 RX ADMIN — FENTANYL CITRATE 25 MCG: 50 INJECTION INTRAMUSCULAR; INTRAVENOUS at 09:17

## 2024-02-23 RX ADMIN — Medication 300 MCG: at 09:45

## 2024-02-23 RX ADMIN — FENTANYL CITRATE 25 MCG: 50 INJECTION INTRAMUSCULAR; INTRAVENOUS at 09:39

## 2024-02-23 RX ADMIN — LIDOCAINE HYDROCHLORIDE 50 MG: 10 INJECTION, SOLUTION EPIDURAL; INFILTRATION; INTRACAUDAL; PERINEURAL at 09:10

## 2024-02-23 RX ADMIN — GABAPENTIN 300 MG: 300 CAPSULE ORAL at 06:45

## 2024-02-23 RX ADMIN — ONDANSETRON 4 MG: 2 INJECTION INTRAMUSCULAR; INTRAVENOUS at 09:15

## 2024-02-23 RX ADMIN — Medication 300 MCG: at 09:33

## 2024-02-23 RX ADMIN — Medication 200 MCG: at 09:28

## 2024-02-23 RX ADMIN — Medication 300 MCG: at 09:20

## 2024-02-23 RX ADMIN — CEFAZOLIN SODIUM 2000 MG: 2 SOLUTION INTRAVENOUS at 09:05

## 2024-02-23 NOTE — ANESTHESIA PREPROCEDURE EVALUATION
Procedure:  WIDE LOCAL EXCISION OF LEFT FOOT MELANOMA (Left: Foot)  SENTINEL LYMPH NODE BIOPSY; 0800 NUC MED (Left: Foot)  RECONSTRUCTION OF LEFT DORSAL FOOT WOUND AFTER RESECTION OF MELANOMA WITH  SKIN GRAFT SPLIT THICKNESS WITH VAC PLACEMENT, POSSIBLE INTEGRA. (Left: Foot)    Relevant Problems   ANESTHESIA (within normal limits)      CARDIO   (+) Hyperlipidemia   (+) Hypertension      ENDO   (+) Diabetes mellitus, type 2 (HCC) (On metformin)      PULMONARY   (+) Sleep apnea (Pt reports was not recommended CPAP so presumably mild)   (-) Smoking   (-) URI (upper respiratory infection)      Other   (+) Malignant melanoma of left foot (HCC)      Physical Exam    Airway    Mallampati score: III  TM Distance: <3 FB  Neck ROM: full     Dental   Comment: None loose; recent dental surgery for implants and remains on abx but no open wounds, healing well, no bleeding/drainage     Cardiovascular      Pulmonary      Other Findings  post-pubertal.     Lab Results   Component Value Date    WBC 6.78 01/17/2024    HGB 10.6 (L) 01/17/2024     01/17/2024     Lab Results   Component Value Date    SODIUM 135 01/17/2024    K 3.2 (L) 01/17/2024    BUN 13 01/17/2024    CREATININE 0.93 01/17/2024    EGFR 63 01/17/2024     Anesthesia Plan  ASA Score- 2     Anesthesia Type- general with ASA Monitors.         Additional Monitors:     Airway Plan: LMA.           Plan Factors-Exercise tolerance (METS): >4 METS.    Chart reviewed.   Existing labs reviewed. Patient summary reviewed.    Patient is not a current smoker.              Induction- intravenous.    Postoperative Plan-     Informed Consent- Anesthetic plan and risks discussed with patient.  I personally reviewed this patient with the CRNA. Discussed and agreed on the Anesthesia Plan with the CRNA..

## 2024-02-23 NOTE — INTERIM OP NOTE
WIDE LOCAL EXCISION OF LEFT FOOT MELANOMA, SENTINEL LYMPH NODE BIOPSY; 0800 NUC MED  Postoperative Note  PATIENT NAME: Lisa Ramirez  : 1955  MRN: [id number]  [facility] OR ROOM 01    Surgery Date: 2024    Preop Diagnosis:  Malignant melanoma of left foot (HCC) [C43.72]    Post-Op Diagnosis Codes:     * Malignant melanoma of left foot (HCC) [C43.72]    Procedure(s) (LRB):  WIDE LOCAL EXCISION OF LEFT FOOT MELANOMA (Left)  SENTINEL LYMPH NODE BIOPSY; 0800 NUC MED (Left)  RECONSTRUCTION OF LEFT DORSAL FOOT WOUND AFTER RESECTION OF MELANOMA WITH  SKIN GRAFT SPLIT THICKNESS WITH VAC PLACEMENT, POSSIBLE INTEGRA. (Left)    Surgeons and Role:  Panel 1:     * Gisela Chilel MD - Primary     * Radha Piedra PA-C - Assisting  Panel 2:     * Efren Sutton MD - Primary    Specimens:  ID Type Source Tests Collected by Time Destination   1 : [place] Lymph Node #1 Tissue Lymph Node, [place] TISSUE EXAM Gisela Chilel MD 2024 0958    2 : left foot melanoma wide excision stitch marks proximal 12 o'clock Tissue Foot, Left TISSUE EXAM Gisela Chilel MD 2024 1011        Estimated Blood Loss:   Minimal    Anesthesia Type:   General     Findings:    None  Complications:   None      SIGNATURE: Geeta Mack PA-C   DATE: 2024   TIME: 11:15 AM

## 2024-02-23 NOTE — ANESTHESIA POSTPROCEDURE EVALUATION
Post-Op Assessment Note    CV Status:  Stable  Pain Score: 0    Pain management: adequate       Mental Status:  Alert and awake   Hydration Status:  Euvolemic   PONV Controlled:  Controlled   Airway Patency:  Patent     Post Op Vitals Reviewed: Yes    No anethesia notable event occurred.    Staff: Anesthesiologist, CRNA               BP   140/68   Temp   98.5   Pulse  112   Resp   18   SpO2   96

## 2024-02-23 NOTE — OP NOTE
OPERATIVE REPORT  PATIENT NAME: Lisa Ramirez    :  1955  MRN: [id number]  Pt Location: [facility] OR ROOM 01    SURGERY DATE: 2024    Surgeons and Role:  Panel 1:     * Gisela Chilel MD - Primary     * Radha Piedra PA-C - Assisting  Panel 2:     * Efren Sutton MD - Primary    Preop Diagnosis:  Malignant melanoma of left foot (HCC) [C43.72]    Post-Op Diagnosis Codes:     * Malignant melanoma of left foot (HCC) [C43.72]    Procedure(s):  Left - WIDE LOCAL EXCISION OF LEFT FOOT MELANOMA  Left - SENTINEL LYMPH NODE BIOPSY; 0800 NUC MED  Left - RECONSTRUCTION OF LEFT DORSAL FOOT WOUND AFTER RESECTION OF MELANOMA WITH  SKIN GRAFT SPLIT THICKNESS WITH VAC PLACEMENT. POSSIBLE INTEGRA.    Specimen(s):  ID Type Source Tests Collected by Time Destination   1 : [place] Lymph Node #1 Tissue Lymph Node, [place] TISSUE EXAM Gisela Chilel MD 2024 0958    2 : left foot melanoma wide excision stitch marks proximal 12 o'clock Tissue Foot, Left TISSUE EXAM Gisela Chilel MD 2024 1011        Estimated Blood Loss:   Minimal    Drains:  * No LDAs found *    Anesthesia Type:   General    Operative Indications:  Malignant melanoma of left foot (HCC) [C43.72]      Operative Findings:  One enlarged hot and blue SLN    Complications:   None    Procedure and Technique:  The patient was met in preop.  The site of mapping was noted at the left groin.  The patient was then transported to preop and finally to the operating room.  The patient was identified and general endotracheal intubation was achieved.  The patient's left foot melanoma was injected with 1 mL Lymphazurin.  The patient's left foot and groin were prepped and draped in the usual sterile fashion after removal of the hair in this region.  A timeout was performed. A 4 cm incision was made two finger breadths inferior to the inguinal fold.  The deeper tissues were divided with electrocautery down to the scarpas fascia which was divided with  electrocautery.  One lymph node was found in the superifical inguinal compartment which was both hot and blue.  This lymph node was circumferentially dissected and larger branches tied off.  It was then passed off field as sentinel lymph node 1. The rest of the lymph node basin was carefully checked for any signs of abnormality, blue dye, or elevated [place] counts.  No other nodes were detected.  The axilla was then copiously irrigated and found to be hemostatic.  Scarpas fascia as well as the deep dermal tissue were closed with 3-0 Vicryl.  The skin was run with 4-0 Monocryl followed by skin glue.  Steri-Strips were placed over the incision after the glue was dry.  We then turned our attention to the primary melanoma of the left foot.  The margins of the melanoma were marked at 10 x 11 cm.  A circumferential margin of 1 cm was marked.  The skin was incised sharply to the peritendineum.  The melanoma was then dissected off of the pertendineum with electrocautery.  The specimen was marked with a stitch at the proximal 12 o clock.  The final dimensions were 3.3 x 3.5 cm. Dr Sutton then assumed care of the patient for closure.     I was present for the entire procedure. and A physician assistant was required during the procedure for retraction, tissue handling, dissection and suturing.    Patient Disposition:  Intubated and stable    Wide Local Excision for Primary Cutaneous Melanoma - Excision 1 (Foot - Left)  Operation performed with curative intent Yes   Original [place] thickness of the lesion 0.8 mm (to the tenth of a millimeter)   Clinical margin width   (measured from the edge of the lesion or the prior excision scar) 1 cm   Depth of excision Full-thickness skin/subcutaneous tissue down to fascia (melanoma)            SIGNATURE: Gisela Chilel MD  DATE: February 23, 2024  TIME: 10:17 AM

## 2024-02-23 NOTE — INTERVAL H&P NOTE
H&P reviewed. After examining the patient I find no changes in the patients condition since the H&P had been written.    Vitals:    02/23/24 0632   BP: 144/86   Pulse: 91   Resp: 16   Temp: 97.9 °F (36.6 °C)   SpO2: 96%

## 2024-02-23 NOTE — DISCHARGE INSTR - AVS FIRST PAGE
.[facility]  Dr. ESMER Gutiérrez Jr.  [address] [place] [address]  Phone: [phone]     Postoperative Instructions for Outpatient Surgery     These instructions are being provided by your doctor to give you basic guidelines during your post-op recovery. Please let our office know if your contact information has changed.      Please call the office today for an appointment in 7 days for postoperative care     Dressings: Keep clean and dry.      Activity Restrictions: No weight bearing to left leg     Bathing:  Keep splint clean and dry.      Medications:    Resume pre-op medications.   You may take tylenol, aleve, or ibuprofen for pain control             Ultram as needed for pain.     Other: Elevate left foot when at rest. No weight bearing on left foot.

## 2024-02-23 NOTE — OP NOTE
OPERATIVE REPORT  PATIENT NAME: Lisa Ramirez    :  1955  MRN: [id number]  Pt Location: [facility] OR ROOM 01    SURGERY DATE: 2024    Surgeons and Role:  Panel 1:     * Gisela Chilel MD - Primary     * Radha Piedra PA-C - Assisting  Panel 2:     * Efren Sutton MD - Primary    Preop Diagnosis:  Malignant melanoma of left foot (HCC) [C43.72]    Post-Op Diagnosis Codes:     * Malignant melanoma of left foot (HCC) [C43.72]    Procedure(s):  Reconstruction of left dorsal foot wound (3.5x3.5 cm) with split-thickness skin graft (3.5x3.5 cm, total 12.25 cm2)  VAC bolster to left dorsal foot skin graft (3.5x3.5 cm)    Specimen(s):  ID Type Source Tests Collected by Time Destination   1 : [place] Lymph Node #1 Tissue Lymph Node, [place] TISSUE EXAM Gisela Chilel MD 2024  9:58 AM    2 : left foot melanoma wide excision stitch marks proximal 12 o'clock Tissue Foot, Left TISSUE EXAM Gisela Chilel MD 2024 10:11 AM        Estimated Blood Loss:   Minimal    Drains:  * No LDAs found *    Anesthesia Type:   General    Operative Indications:  Malignant melanoma of left foot (HCC) [C43.72]    Operative Findings:  3.5x3.5 left, lateral dorsal foot wound with exposed tendon but intact peritenon  Split-thickness skin graft from left thigh 3.5x3.5 cm  Total of 7 cc of 1% lidocaine with epi infiltrated in left thigh donor site    Complications:   None    Procedure and Technique:  Patient was already in the operating room in supine fashion as Dr Chilel had performed SLNB and melanoma resection. A timeout was performed at which point all patient identifiers were deemed to be correct. The left lower extremity was re-prepped and draped in the normal sterile fashion. I first examined the wound which had exposed tendon at the wound base, but intact peritenon. I then proceeded with harvesting a split-thickness skin graft at 12/1000th of an inch from left thigh using deepika dermatome, pie-crusted, and secured to  the left lower extremity defect site with 3-0 running chromic suture. A VACVIA bolster was placed and secured with good suction, no leak. The foot was then placed into splint. The left thigh donor site was dressed with shilpi, optilock, and tegederm dressing, followed by ace-wrap.     This concluded the procedure. Patient tolerated the procedure well without complications. At the end of the case, all sponge, needle, and instrument counts were correct. Patient was awakened from anesthesia and taken to the PACU in stable condition.    I was present for the entire procedure, A qualified resident physician was not available and A physician assistant was required during the procedure for retraction, tissue handling, dissection and suturing       I was present for the entire procedure.    Patient Disposition:  PACU       SIGNATURE: Efren Sutton MD  DATE: February 23, 2024  TIME: 11:03 AM

## 2024-02-27 ENCOUNTER — TELEPHONE (OUTPATIENT)
Age: 69
End: 2024-02-27

## 2024-02-27 NOTE — TELEPHONE ENCOUNTER
Patient called to let us know her wound vac was making noise and the lights were blinking. I wasn't able to help much ai advised to try and see if it was suctioning. While I had her on hold it looks like it stopped making noise and it was fine.    I advised to call us again if it happens, and that I would send message to office staff to advise as well.

## 2024-03-01 ENCOUNTER — OFFICE VISIT (OUTPATIENT)
Dept: PLASTIC SURGERY | Facility: CLINIC | Age: 69
End: 2024-03-01

## 2024-03-01 DIAGNOSIS — C43.72 MALIGNANT MELANOMA OF LEFT FOOT (HCC): Primary | ICD-10-CM

## 2024-03-01 NOTE — PROGRESS NOTES
Assessment/Plan:     Patient is a 68 YOF who is s/p wide excision of melanoma of the left foot with reconstruction with split-thickness skin graft and VAC bolster to left dorsal foot by Dr. Sutton in conjunction with Dr. Chilel on 2/23/2024.  Please see HPI.    Patient returns to the office today for first visit, VAC removal and graft check.  Graft is well adhered and viable.    Patient will continue with Aquaphor, Xeroform, Kerlix, splint and Ace wrap to foot.  Aquaphor, Xeroform and silicone border dressing to left thigh donor site.  Nonweightbearing.  No movement of the foot.  She will return to office in approximately 1 week for graft check or sooner with any concerns.     Diagnoses and all orders for this visit:    Malignant melanoma of left foot (HCC)          Subjective:     Patient ID: Lisa Ramirez is a 68 y.o. female.    HPI    Patient reports that she has been doing well postoperatively.  No issues or concerns today.    Review of Systems    See HPI     Objective:     Physical Exam      Graft is well adhered and viable.  Please see photo in media.  Donor site is healing appropriately.

## 2024-03-04 DIAGNOSIS — C43.72 MALIGNANT MELANOMA OF LEFT FOOT (HCC): Primary | ICD-10-CM

## 2024-03-05 PROCEDURE — 88342 IMHCHEM/IMCYTCHM 1ST ANTB: CPT | Performed by: STUDENT IN AN ORGANIZED HEALTH CARE EDUCATION/TRAINING PROGRAM

## 2024-03-05 PROCEDURE — 88341 IMHCHEM/IMCYTCHM EA ADD ANTB: CPT | Performed by: STUDENT IN AN ORGANIZED HEALTH CARE EDUCATION/TRAINING PROGRAM

## 2024-03-05 PROCEDURE — 88307 TISSUE EXAM BY PATHOLOGIST: CPT | Performed by: STUDENT IN AN ORGANIZED HEALTH CARE EDUCATION/TRAINING PROGRAM

## 2024-03-05 PROCEDURE — 88305 TISSUE EXAM BY PATHOLOGIST: CPT | Performed by: STUDENT IN AN ORGANIZED HEALTH CARE EDUCATION/TRAINING PROGRAM

## 2024-03-06 ENCOUNTER — NURSE TRIAGE (OUTPATIENT)
Dept: PLASTIC SURGERY | Facility: CLINIC | Age: 69
End: 2024-03-06

## 2024-03-06 NOTE — TELEPHONE ENCOUNTER
"Pt and [facility] nurse, Gunjan, calling to relay concern of graft site duskiness on left foot wide local melanoma excision from 2/23. Adds graft site looks good. Reports no other sx other than some \"tingling\" under the graft.     Confirmed pt has been non weight bearing and been doing Aquaphor, Xeroform, Kerlix, splint and Ace wrap to foot since vac removal. Confirmed pt has appt for wound check 3/8. Also sending pictures in [employer] for review.     Gunjan notes can call back with any updates to [phone]. No other questions at this time.     Answer Assessment - Initial Assessment Questions  1. SYMPTOM: \"What's the main symptom you're concerned about?\" (e.g., redness, pain, drainage)      Dusky graft  2. ONSET: \"When did sx  start?\"      Noted today during np intake and would care  3. SURGERY: \"What surgery was performed?\"      WIDE LOCAL EXCISION OF LEFT FOOT MELANOMA  4. DATE of SURGERY: \"When was surgery performed?\"       2/23  5. INCISION SITE: \"Where is the incision located?\"       Left foot  6. REDNESS: \"Is there any redness at the incision site?\" If yes, ask: \"How wide across is the redness?\" (Inches, centimeters)       N/a  7. PAIN: \"Is there any pain?\" If Yes, ask: \"How bad is it?\"  (Scale 1-10; or mild, moderate, severe)      none  8. BLEEDING: \"Is there any bleeding?\" If Yes, ask: \"How much?\" and \"Where?\"      none  9. DRAINAGE: \"Is there any drainage from the incision site?\" If yes, ask: \"What color and how much?\" (e.g., red, cloudy, pus; drops, teaspoon)      A little serous drainage  10. FEVER: \"Do you have a fever?\" If Yes, ask: \"What is your temperature, how was it measured, and when did it start?\"        none  11. OTHER SYMPTOMS: \"Do you have any other symptoms?\" (e.g., shaking chills, weakness, rash elsewhere on body)        Pt reports tingling under wound    Protocols used: Post-Op Incision Symptoms and Questions-ADULT-[place]    "

## 2024-03-06 NOTE — TELEPHONE ENCOUNTER
Called Gunjan back to update that per Sahra wound looks good and pt ok to be seen Friday. No other questions or concerns at this time.

## 2024-03-07 ENCOUNTER — TELEPHONE (OUTPATIENT)
Age: 69
End: 2024-03-07

## 2024-03-07 NOTE — TELEPHONE ENCOUNTER
Received call from patients  asking gif there were any cancellations in [place] for tomorrow since [place] is 30 minutes closer to him.      Patient was informed that there are no cancellations at [place] tomorrow.    He stated patient will keep the appt in [place].

## 2024-03-08 ENCOUNTER — OFFICE VISIT (OUTPATIENT)
Dept: PLASTIC SURGERY | Facility: CLINIC | Age: 69
End: 2024-03-08

## 2024-03-08 ENCOUNTER — TELEPHONE (OUTPATIENT)
Age: 69
End: 2024-03-08

## 2024-03-08 DIAGNOSIS — C43.72 MALIGNANT MELANOMA OF LEFT FOOT (HCC): Primary | ICD-10-CM

## 2024-03-08 NOTE — TELEPHONE ENCOUNTER
Spoke to physical therapist from [facility]. PT notes that pt has been laying in bed all day as pt reports she was advised. Pt reported she was discharged with no other instructions. PT concerned as pt was previously very active at baseline. Also states  is not capable of assisting with care due to his own dementia and physical limitations.     Reviewed pt scheduled for appt today. Reviewed discharge instructions in AVS of non weightbearing to left leg, Elevate left foot when at rest. No weight bearing on left foot.    Noted will pass along for fyi for provider for todays appt. No other questions at this time.

## 2024-03-08 NOTE — TELEPHONE ENCOUNTER
Reva called requesting to speak with a nurse or someone working with Raiza .   Call transferred to Jaz

## 2024-03-08 NOTE — PROGRESS NOTES
Assessment/Plan:     Patient is a 68 YOF who is s/p wide excision of melanoma of the left foot with reconstruction with split-thickness skin graft and VAC bolster to left dorsal foot by Dr. Sutton in conjunction with Dr. Chilel on 2/23/2024.  Please see HPI.     Patient returns to clinic for graft check.  Graft, visible granulated tissue.  There is visible hypergranulating tissue centrally.  Please see photo media.    Patient will continue with immobilization of the foot.  She should apply Aquaphor and Xeroform to the graft site daily, then ABD, Kerlix, splint and Ace wrap.  She will return to the office approximately 1 week for graft check.     Diagnoses and all orders for this visit:    Malignant melanoma of left foot (HCC)          Subjective:     Patient ID: Lisa Ramirez is a 68 y.o. female.    HPI    Patient reports that she has had difficulty with changing her dressings.  She is thankful for visiting nurses.    Review of Systems    See HPI     Objective:     Physical Exam      Graft is loosely adhered, visible hypergranulated tissue noted centrally.  Please see photo

## 2024-03-11 ENCOUNTER — NURSE TRIAGE (OUTPATIENT)
Age: 69
End: 2024-03-11

## 2024-03-11 NOTE — TELEPHONE ENCOUNTER
"Gunjan, home health care nurse, calling to confirm orders for dressing. Reviewed last advised plan \"Patient will continue with immobilization of the foot.  She should apply Aquaphor and Xeroform to the graft site daily, then ABD, Kerlix, splint and Ace wrap.  She will return to the office approximately 1 week for graft check.\"    Also inquiring if pt can do stairs on butt that she has been doing with PT. Noted no specific PT parameters included and to proceed with exercises as advised by PT. No other questions at this time.  "

## 2024-03-11 NOTE — TELEPHONE ENCOUNTER
Call received, Lavonne noting that she was on the phone with pt discussing continuing pt, pt stated she didn't want PT anymore and hung up the phone. Lavonne notes she had tried to explain to pt that she just wanted to ensure she was functionally safe in her home and was able to get out to appts. Per Lavonne, pt has now bee discharged from PT home services. Advised will relay update to provider. No other questions at this time.

## 2024-03-11 NOTE — TELEPHONE ENCOUNTER
Gunjan called back after leaving pt, confiding pt not happy when they call that they aren't speaking to Sahra directly. Pt requests Brie call them back next time.     Noted have reviewed instructions from OV note written by Sahra, suggested pt and nurse view in mychart together. Also faxed copy of OV note with instructions to [facility] at [phone] per Gunjan request.     Noted will pass along pt request.

## 2024-03-13 NOTE — TELEPHONE ENCOUNTER
Gunjan from home health nurse called to report that the area around the graft is macerated she is aware about the appointment on 03/15 but she needs to report it.   Also she would like to know if is okay to use a knee stroller .

## 2024-03-14 ENCOUNTER — OFFICE VISIT (OUTPATIENT)
Dept: SURGICAL ONCOLOGY | Facility: CLINIC | Age: 69
End: 2024-03-14

## 2024-03-14 VITALS
OXYGEN SATURATION: 98 % | HEIGHT: 65 IN | SYSTOLIC BLOOD PRESSURE: 112 MMHG | HEART RATE: 105 BPM | RESPIRATION RATE: 18 BRPM | BODY MASS INDEX: 29.42 KG/M2 | TEMPERATURE: 97.2 F | DIASTOLIC BLOOD PRESSURE: 70 MMHG

## 2024-03-14 DIAGNOSIS — C43.72 MALIGNANT MELANOMA OF LEFT FOOT (HCC): Primary | ICD-10-CM

## 2024-03-14 PROCEDURE — 99024 POSTOP FOLLOW-UP VISIT: CPT | Performed by: STUDENT IN AN ORGANIZED HEALTH CARE EDUCATION/TRAINING PROGRAM

## 2024-03-14 NOTE — PROGRESS NOTES
Surgical Oncology Consultation F/U    [address] [facility]  [facility]  [address]  [place] [address]    Patient:  Lisa Ramirez  1955  [id number]    Primary Care provider:  Ryanne Tariq MD  [address] [facility]  [place] [address]    Referring provider:  No referring provider defined for this encounter.    Diagnoses and all orders for this visit:    Malignant melanoma of left foot (HCC)        Chief Complaint   Patient presents with    Post-op       No follow-ups on file.    Oncology History   Malignant melanoma of left foot (HCC)   12/21/2023 Biopsy    A. Skin, left dorsal foot, shave biopsy:     MELANOMA (thickness: at least 0.8 mm); transected (see note).     Synoptic report for melanoma of the skin  Thickness: at least 0.8 mm (invasive melanoma broadly transected at deep specimen margin)  Ulceration: not seen  Mitoses: 2/mm2         1/12/2024 Initial Diagnosis    Malignant melanoma of left foot (HCC)     1/16/2024 -  Cancer Staged    Staging form: Melanoma of the Skin, AJCC 8th Edition  - Clinical: Stage IB (cT1b, cN0, cM0) - Signed by Gisela Chilel MD on 1/16/2024 2/23/2024 Surgery    A. Lymph node, sentinel node #1:      One lymph node; metastatic melanoma not definitively seen (0/1) (see note).        B. Skin, left foot, excision:     -Residual MELANOMA (thickness: 1.0 mm); not seen at examined inked specimen margins (see note).     -Prior procedure site changes present.     Note: SOX10, MART-1, HMB45, and PRAME immunostains were reviewed. Please see synoptic report for additional details.      Synoptic report for melanoma of the skin  Thickness: 1.0 mm  Ulceration: not seen  Anatomic (Castillo) level: IV  Type: cannot be determined  Mitoses: 0/mm2  Microsatellites: not seen  Lymphovascular invasion: not seen  Neurotropism: not seen  Tumor regression: not seen  Tumor-infiltrating lymphocytes (TIL): cannot be  determined  Margin assessment: not seen at examined inked specimen margins  Pathologic stage: pT1b  Associated nevus: not seen         History of Present Illness  :   New at least T1b of left lateral dorsum of foot. States lesion arose over the last year and was ultimately biopsied by derm. Appeared erythematous and raised and a shave bx was performed revealing an at least T1b sam with positive deep margin. She's now s/p WLE SLN. No issues. Plastics taking care of wound.     Review of Systems  Complete ROS Surg Onc:   Constitutional: The patient denies new or recent history of general fatigue, no recent weight loss, no change in appetite.   Eyes: No complaints of visual problems, no scleral icterus.   ENT: No complaints of ear pain, no hoarseness, no difficulty swallowing,  no tinnitus and no new masses in head, oral cavity, or neck.   Cardiovascular: No complaints of chest pain, no palpitations, no ankle edema.   Respiratory: No complaints of shortness of breath, no cough.   Gastrointestinal: No complaints of jaundice, no bloody stools, no pale stools.   Genitourinary: No complaints of dysuria, no hematuria, no nocturia, no frequent urination, no urethral discharge.   Musculoskeletal: No complaints of weakness, paralysis, joint stiffness or arthralgias.  Integumentary: No complaints of rash, no new lesions.   Neurological: No complaints of convulsions, no seizures, no dizziness.   Hematologic/Lymphatic: No complaints of easy bruising.   Endocrine:  No hot or cold intolerance.  No polydipsia, polyphagia, or polyuria.  Allergy/immunology:  No environmental allergies.  No food allergies.  Not immunocompromised.      Patient Active Problem List   Diagnosis    Malignant melanoma of left foot (HCC)    Hypertension    Sleep apnea    Hyperlipidemia    Diabetes mellitus, type 2 (HCC)     Past Medical History:   Diagnosis Date    Anxiety     Hypertension 2/23/2024    Sleep apnea 2/23/2024    no CPAP use     Past Surgical  History:   Procedure Laterality Date    COLONOSCOPY      LYMPH NODE BIOPSY Left 2024    Procedure: SENTINEL LYMPH NODE BIOPSY; 0800 NUC MED;  Surgeon: Gisela Chilel MD;  Location: [facility] MAIN OR;  Service: Surgical Oncology    SKIN LESION EXCISION Left 2024    Procedure: WIDE LOCAL EXCISION OF LEFT FOOT MELANOMA;  Surgeon: Gisela Chilel MD;  Location: [facility];  Service: Surgical Oncology    SPLIT THICKNESS SKIN GRAFT Left 2024    Procedure: RECONSTRUCTION OF LEFT DORSAL FOOT WOUND AFTER RESECTION OF MELANOMA WITH  SKIN GRAFT SPLIT THICKNESS WITH VAC PLACEMENT, POSSIBLE INTEGRA.;  Surgeon: Efren Sutton MD;  Location:  MAIN OR;  Service: Plastics    TONSILLECTOMY      UPPER GASTROINTESTINAL ENDOSCOPY  2010    WRIST FRACTURE SURGERY Right      No family history on file.  Social History     Socioeconomic History    Marital status: /Civil Union     Spouse name: Not on file    Number of children: Not on file    Years of education: Not on file    Highest education level: Not on file   Occupational History    Not on file   Tobacco Use    Smoking status: Former     Average packs/day: 2.0 packs/day for 25.0 years (50.0 ttl pk-yrs)     Types: Cigarettes     Start date: 1970     Quit date:      Years since quittin.2    Smokeless tobacco: Never   Vaping Use    Vaping status: Never Used   Substance and Sexual Activity    Alcohol use: Yes     Alcohol/week: 3.0 standard drinks of alcohol     Types: 3 Glasses of wine per week    Drug use: Never    Sexual activity: Not Currently     Partners: Male     Birth control/protection: Female Sterilization   Other Topics Concern    Not on file   Social History Narrative    Not on file     Social Determinants of Health     Financial Resource Strain: Not At Risk (10/14/2023)    Received from [facility]    Financial Resource Strain     In the last 12 months did you skip medications to save money?: No     In the last  12 months, was there a time when you needed to see a doctor but could not because of cost?: No   Food Insecurity: Not At Risk (10/14/2023)    Received from [facility]    Food Insecurity     In the last 12 months did you ever eat less than you felt you should because there wasn't enough money for food?: No   Transportation Needs: Not At Risk (10/14/2023)    Received from [facility]    Transporation     In the last 12 months, have you ever had to go without healthcare because you didn't have a way to get there?: No   Physical Activity: Unknown (3/15/2023)    Received from [facility]    Exercise Vital Sign     Days of Exercise per Week: 3 days     Minutes of Exercise per Session: Not on file   Stress: Stress Concern Present (3/15/2023)    Received from [facility]    [employer] [place] [employer] - Occupational Stress Questionnaire     Feeling of Stress : To some extent   Social Connections: Not At Risk (10/14/2023)    Received from [facility]    Social Connections     Do you often feel lonely?: No   Intimate Partner Violence: Not on file   Housing Stability: Not At Risk (10/14/2023)    Received from [facility]    Housing Stability     Are you worried that in the next 2 months you may not have stable housing?: No       Current Outpatient Medications:     escitalopram (LEXAPRO) 10 mg tablet, Take 10 mg by mouth daily, Disp: , Rfl:     hydrochlorothiazide (HYDRODIURIL) 12.5 mg tablet, Take 12.5 mg by mouth daily at bedtime, Disp: , Rfl:     losartan (COZAAR) 50 mg tablet, Take 50 mg by mouth daily at bedtime, Disp: , Rfl:     metFORMIN HCl  MG/5ML SRER, in the morning, Disp: , Rfl:     Multiple Vitamins-Minerals (Multivitamin Adults) TABS, Take by mouth, Disp: , Rfl:     simvastatin (ZOCOR) 20 mg tablet, Take 20 mg by mouth daily at bedtime, Disp: ,  Rfl:     tretinoin (Retin-A) 0.025 % cream, Apply topically daily at bedtime, Disp: , Rfl:     zolpidem (AMBIEN CR) 12.5 MG CR tablet, Take 12.5 mg by mouth as needed, Disp: , Rfl:     ibuprofen (MOTRIN) 600 mg tablet, as needed, Disp: , Rfl:     traMADol (Ultram) 50 mg tablet, Take 1 tablet (50 mg total) by mouth every 6 (six) hours as needed for moderate pain for up to 18 doses, Disp: 18 tablet, Rfl: 0  No Known Allergies    Vitals:    03/14/24 1514   BP: 112/70   Pulse: 105   Resp: 18   Temp: (!) 97.2 °F (36.2 °C)   SpO2: 98%       Physical Exam   General: Appears well, appears stated age  Skin: Warm, anicteric. LFT foot dorsal scab about 8 mm in size  HEENT: Normocephalic, atraumatic; sclera aniceteric, mucous membranes moist; cervical nodes without adenopathy  Cardiopulmonary: RRR, Easy WOB, no BLE edema  Abd: Flat and soft, nontender, no masses appreciated, no hepatosplenomegaly  MSK: Symmetric, no cyanosis, no overt weakness  Lymphatic: No cervical, axillary or inguinal lymphadenopathy  Neuro: Affect appropriate, no gross motor abnormalities    Pathology:  Final Diagnosis   Consult case (9 slides [id number]  [facility] [employer], collected 12/21/2023):     A. Skin, left dorsal foot, shave biopsy:     MELANOMA (thickness: at least 0.8 mm); transected (see note).     Note: The provided immunostains were reviewed; the lesional cells are positive for SOX10, Melan-A, and PRAME. Please see synoptic report for additional details.     Synoptic report for melanoma of the skin  Thickness: at least 0.8 mm (invasive melanoma broadly transected at deep specimen margin)  Ulceration: not seen  Anatomic (Castillo) level: at least IV  Type: cannot be determined  Mitoses: 2/mm2  Microsatellites: cannot be determined   Lymphovascular invasion: not seen  Neurotropism: not seen  Tumor regression: not seen  Tumor-infiltrating lymphocytes (TIL): present, non-brisk  Margin assessment: invasive melanoma extends to peripheral and deep  specimen margin  Pathologic stage: at least pT1b  Associated nevus: not seen       Labs: Reviewed in EPIC    Imaging  No results found.    I independently reviewed and interpreted the above laboratory and imaging data incl derm notes, path referral      Discussion/Summary:   Pt is s/p WLE + SLN for T1bN0 melanoma of left dorsal foot. SLN neg. Path reviewed. Dicussed surveillance with q3 mo visits with derm for skin checks as well as q6 mo visits with me for repeat PE. Discussed vigilance about new skin lesions in this region or new lumps/bumps in LN basins. Discussed need for continued sun protection with sunscreen, hats, minimizing sun exposure. Patient and family expressed understanding and endorsement. To see plastics for ongoing wound care

## 2024-03-15 ENCOUNTER — OFFICE VISIT (OUTPATIENT)
Dept: PLASTIC SURGERY | Facility: CLINIC | Age: 69
End: 2024-03-15

## 2024-03-15 DIAGNOSIS — C43.72 MALIGNANT MELANOMA OF LEFT FOOT (HCC): Primary | ICD-10-CM

## 2024-03-15 NOTE — PROGRESS NOTES
Assessment/Plan:     Diagnoses and all orders for this visit:    Malignant melanoma of left foot (HCC)  She underwent reconstruction of left dorsal foot wound with split thickness skin graft for melanoma on 2/23 by Dr. Sutton. Graft appears intact & viable. Sutures removed. Aquafor, ABD & Kerlix applied. She can start toe touch. We will see her back in 1 week.         Subjective:      Patient ID: Lisa Ramirez is a 68 y.o. female.    HPI    Pt is here for a post-op visit. She underwent reconstruction of left dorsal foot wound with split thickness skin graft for melanoma on 2/23 by Dr. Sutton. Pt states she is doing well.     Patient Active Problem List   Diagnosis    Malignant melanoma of left foot (HCC)    Hypertension    Sleep apnea    Hyperlipidemia    Diabetes mellitus, type 2 (HCC)     No Known Allergies  Current Outpatient Medications on File Prior to Visit   Medication Sig    escitalopram (LEXAPRO) 10 mg tablet Take 10 mg by mouth daily    hydrochlorothiazide (HYDRODIURIL) 12.5 mg tablet Take 12.5 mg by mouth daily at bedtime    ibuprofen (MOTRIN) 600 mg tablet as needed    losartan (COZAAR) 50 mg tablet Take 50 mg by mouth daily at bedtime    metFORMIN HCl  MG/5ML SRER in the morning    Multiple Vitamins-Minerals (Multivitamin Adults) TABS Take by mouth    simvastatin (ZOCOR) 20 mg tablet Take 20 mg by mouth daily at bedtime    traMADol (Ultram) 50 mg tablet Take 1 tablet (50 mg total) by mouth every 6 (six) hours as needed for moderate pain for up to 18 doses    tretinoin (Retin-A) 0.025 % cream Apply topically daily at bedtime    zolpidem (AMBIEN CR) 12.5 MG CR tablet Take 12.5 mg by mouth as needed     No current facility-administered medications on file prior to visit.     No family history on file.  Past Medical History:   Diagnosis Date    Anxiety     Hypertension 2/23/2024    Sleep apnea 2/23/2024    no CPAP use     Social History     Socioeconomic History    Marital status: /Civil Union      Spouse name: Not on file    Number of children: Not on file    Years of education: Not on file    Highest education level: Not on file   Occupational History    Not on file   Tobacco Use    Smoking status: Former     Average packs/day: 2.0 packs/day for 25.0 years (50.0 ttl pk-yrs)     Types: Cigarettes     Start date: 1970     Quit date:      Years since quittin.2    Smokeless tobacco: Never   Vaping Use    Vaping status: Never Used   Substance and Sexual Activity    Alcohol use: Yes     Alcohol/week: 3.0 standard drinks of alcohol     Types: 3 Glasses of wine per week    Drug use: Never    Sexual activity: Not Currently     Partners: Male     Birth control/protection: Female Sterilization   Other Topics Concern    Not on file   Social History Narrative    Not on file     Social Determinants of Health     Financial Resource Strain: Not At Risk (10/14/2023)    Received from [facility]    Financial Resource Strain     In the last 12 months did you skip medications to save money?: No     In the last 12 months, was there a time when you needed to see a doctor but could not because of cost?: No   Food Insecurity: Not At Risk (10/14/2023)    Received from [facility]    Food Insecurity     In the last 12 months did you ever eat less than you felt you should because there wasn't enough money for food?: No   Transportation Needs: Not At Risk (10/14/2023)    Received from [facility]    Transporation     In the last 12 months, have you ever had to go without healthcare because you didn't have a way to get there?: No   Physical Activity: Unknown (3/15/2023)    Received from [facility]    Exercise Vital Sign     Days of Exercise per Week: 3 days     Minutes of Exercise per Session: Not on file   Stress: Stress Concern Present (3/15/2023)    Received from [facility]    [place]  [employer] - Occupational Stress Questionnaire     Feeling of Stress : To some extent   Social Connections: Not At Risk (10/14/2023)    Received from [facility]    Social Connections     Do you often feel lonely?: No   Intimate Partner Violence: Not on file   Housing Stability: Not At Risk (10/14/2023)    Received from [facility]    Housing Stability     Are you worried that in the next 2 months you may not have stable housing?: No     Past Surgical History:   Procedure Laterality Date    COLONOSCOPY  2022    LYMPH NODE BIOPSY Left 2/23/2024    Procedure: SENTINEL LYMPH NODE BIOPSY; 0800 NUC MED;  Surgeon: Gisela Chilel MD;  Location:  MAIN OR;  Service: Surgical Oncology    SKIN LESION EXCISION Left 2/23/2024    Procedure: WIDE LOCAL EXCISION OF LEFT FOOT MELANOMA;  Surgeon: Gisela Chilel MD;  Location:  MAIN OR;  Service: Surgical Oncology    SPLIT THICKNESS SKIN GRAFT Left 2/23/2024    Procedure: RECONSTRUCTION OF LEFT DORSAL FOOT WOUND AFTER RESECTION OF MELANOMA WITH  SKIN GRAFT SPLIT THICKNESS WITH VAC PLACEMENT, POSSIBLE INTEGRA.;  Surgeon: Efren Sutton MD;  Location:  MAIN OR;  Service: Plastics    TONSILLECTOMY  1958    UPPER GASTROINTESTINAL ENDOSCOPY  2010    WRIST FRACTURE SURGERY Right          Review of Systems   All other systems reviewed and are negative.        Objective:      There were no vitals taken for this visit.         Physical Exam  Constitutional:       Appearance: Normal appearance. She is well-developed.   HENT:      Head: Normocephalic and atraumatic.   Eyes:      Conjunctiva/sclera: Conjunctivae normal.   Pulmonary:      Effort: Pulmonary effort is normal.   Musculoskeletal:      Cervical back: Normal range of motion.   Skin:     General: Skin is warm and dry.      Comments: Left leg graft is intact & viable. Sutures removed. See picture in media.    Neurological:      Mental Status: She is alert and  oriented to person, place, and time.   Psychiatric:         Mood and Affect: Mood normal.         Behavior: Behavior normal.

## 2024-03-18 ENCOUNTER — TELEPHONE (OUTPATIENT)
Age: 69
End: 2024-03-18

## 2024-03-18 NOTE — TELEPHONE ENCOUNTER
Gunjan called to make us aware that patient requested to discontinue wound care visit since she is doing well.    Gunjan just wanted to make sure that wound care was completed on their end before granting patients request. I advised per clinical notes it was noted to use aquaphor, ABD & kerlix for wound care and she would return to office in one week.

## 2024-03-22 ENCOUNTER — OFFICE VISIT (OUTPATIENT)
Dept: PLASTIC SURGERY | Facility: CLINIC | Age: 69
End: 2024-03-22

## 2024-03-22 DIAGNOSIS — C43.72 MALIGNANT MELANOMA OF LEFT FOOT (HCC): Primary | ICD-10-CM

## 2024-03-22 NOTE — PROGRESS NOTES
Assessment/Plan:     Diagnoses and all orders for this visit:    Malignant melanoma of left foot (HCC)  She underwent reconstruction of left dorsal foot wound with split thickness skin graft for melanoma on 2/23 by Dr. Sutton. Graft is intact & viable. Aquafor & ABD applied. She can bear weight. We will see her back in 2 weeks.          Subjective:      Patient ID: Lisa Ramirez is a 68 y.o. female.    HPI    Pt is here for a post-op visit. She underwent reconstruction of left dorsal foot wound with split thickness skin graft for melanoma on 2/23 by Dr. Sutton. Pt states she is doing well.      Patient Active Problem List   Diagnosis    Malignant melanoma of left foot (HCC)    Hypertension    Sleep apnea    Hyperlipidemia    Diabetes mellitus, type 2 (HCC)     No Known Allergies  Current Outpatient Medications on File Prior to Visit   Medication Sig    escitalopram (LEXAPRO) 10 mg tablet Take 10 mg by mouth daily    hydrochlorothiazide (HYDRODIURIL) 12.5 mg tablet Take 12.5 mg by mouth daily at bedtime    ibuprofen (MOTRIN) 600 mg tablet as needed    losartan (COZAAR) 50 mg tablet Take 50 mg by mouth daily at bedtime    metFORMIN HCl  MG/5ML SRER in the morning    Multiple Vitamins-Minerals (Multivitamin Adults) TABS Take by mouth    simvastatin (ZOCOR) 20 mg tablet Take 20 mg by mouth daily at bedtime    traMADol (Ultram) 50 mg tablet Take 1 tablet (50 mg total) by mouth every 6 (six) hours as needed for moderate pain for up to 18 doses    tretinoin (Retin-A) 0.025 % cream Apply topically daily at bedtime    zolpidem (AMBIEN CR) 12.5 MG CR tablet Take 12.5 mg by mouth as needed     No current facility-administered medications on file prior to visit.     History reviewed. No pertinent family history.  Past Medical History:   Diagnosis Date    Anxiety     Hypertension 2/23/2024    Sleep apnea 2/23/2024    no CPAP use     Social History     Socioeconomic History    Marital status: /Civil Union     Spouse  name: None    Number of children: None    Years of education: None    Highest education level: None   Occupational History    None   Tobacco Use    Smoking status: Former     Average packs/day: 2.0 packs/day for 25.0 years (50.0 ttl pk-yrs)     Types: Cigarettes     Start date: 1970     Quit date:      Years since quittin.2    Smokeless tobacco: Never   Vaping Use    Vaping status: Never Used   Substance and Sexual Activity    Alcohol use: Yes     Alcohol/week: 3.0 standard drinks of alcohol     Types: 3 Glasses of wine per week    Drug use: Never    Sexual activity: Not Currently     Partners: Male     Birth control/protection: Female Sterilization   Other Topics Concern    None   Social History Narrative    None     Social Determinants of Health     Financial Resource Strain: Not At Risk (10/14/2023)    Received from [facility]    Financial Resource Strain     In the last 12 months did you skip medications to save money?: No     In the last 12 months, was there a time when you needed to see a doctor but could not because of cost?: No   Food Insecurity: Not At Risk (10/14/2023)    Received from [facility]    Food Insecurity     In the last 12 months did you ever eat less than you felt you should because there wasn't enough money for food?: No   Transportation Needs: Not At Risk (10/14/2023)    Received from [facility]    Transporation     In the last 12 months, have you ever had to go without healthcare because you didn't have a way to get there?: No   Physical Activity: Unknown (3/15/2023)    Received from [facility]    Exercise Vital Sign     Days of Exercise per Week: 3 days     Minutes of Exercise per Session: Not on file   Stress: Stress Concern Present (3/15/2023)    Received from [facility]    [place] [employer] - Occupational Stress  Questionnaire     Feeling of Stress : To some extent   Social Connections: Not At Risk (10/14/2023)    Received from [facility]    Social Connections     Do you often feel lonely?: No   Intimate Partner Violence: Not on file   Housing Stability: Not At Risk (10/14/2023)    Received from [facility]    Housing Stability     Are you worried that in the next 2 months you may not have stable housing?: No     Past Surgical History:   Procedure Laterality Date    COLONOSCOPY  2022    LYMPH NODE BIOPSY Left 2/23/2024    Procedure: SENTINEL LYMPH NODE BIOPSY; 0800 NUC MED;  Surgeon: Gisela Chilel MD;  Location:  MAIN OR;  Service: Surgical Oncology    SKIN LESION EXCISION Left 2/23/2024    Procedure: WIDE LOCAL EXCISION OF LEFT FOOT MELANOMA;  Surgeon: Gisela Chilel MD;  Location:  MAIN OR;  Service: Surgical Oncology    SPLIT THICKNESS SKIN GRAFT Left 2/23/2024    Procedure: RECONSTRUCTION OF LEFT DORSAL FOOT WOUND AFTER RESECTION OF MELANOMA WITH  SKIN GRAFT SPLIT THICKNESS WITH VAC PLACEMENT, POSSIBLE INTEGRA.;  Surgeon: Efren Sutton MD;  Location:  MAIN OR;  Service: Plastics    TONSILLECTOMY  1958    UPPER GASTROINTESTINAL ENDOSCOPY  2010    WRIST FRACTURE SURGERY Right          Review of Systems   All other systems reviewed and are negative.        Objective:      There were no vitals taken for this visit.         Physical Exam  Constitutional:       Appearance: Normal appearance. She is well-developed.   HENT:      Head: Normocephalic and atraumatic.   Eyes:      Conjunctiva/sclera: Conjunctivae normal.   Pulmonary:      Effort: Pulmonary effort is normal.   Musculoskeletal:         General: Normal range of motion.      Cervical back: Normal range of motion.   Skin:     General: Skin is warm and dry.      Comments: Left leg skin graft is intact & viable. See picture in media.    Neurological:      Mental Status: She is alert and oriented to person,  place, and time.   Psychiatric:         Mood and Affect: Mood normal.         Behavior: Behavior normal.

## 2024-04-05 ENCOUNTER — OFFICE VISIT (OUTPATIENT)
Dept: PLASTIC SURGERY | Facility: HOSPITAL | Age: 69
End: 2024-04-05

## 2024-04-05 DIAGNOSIS — C43.72 MALIGNANT MELANOMA OF LEFT FOOT (HCC): Primary | ICD-10-CM

## 2024-04-05 PROCEDURE — 99024 POSTOP FOLLOW-UP VISIT: CPT | Performed by: PHYSICIAN ASSISTANT

## 2024-04-05 NOTE — PROGRESS NOTES
Assessment/Plan:     Diagnoses and all orders for this visit:    Malignant melanoma of left foot (HCC)  She underwent reconstruction of left dorsal foot wound with split thickness skin graft for melanoma on 2/23 by Dr. Sutton. Graft is well integrated. Aquafor & ABD applied. We will see her back in 4-6 weeks.           Subjective:      Patient ID: Lisa Ramirez is a 68 y.o. female.    HPI    Pt is here for a post-op visit. She underwent reconstruction of left dorsal foot wound with split thickness skin graft for melanoma on 2/23 by Dr. Sutton. Pt states she is doing well.       Patient Active Problem List   Diagnosis    Malignant melanoma of left foot (HCC)    Hypertension    Sleep apnea    Hyperlipidemia    Diabetes mellitus, type 2 (HCC)     No Known Allergies  Current Outpatient Medications on File Prior to Visit   Medication Sig    escitalopram (LEXAPRO) 10 mg tablet Take 10 mg by mouth daily    hydrochlorothiazide (HYDRODIURIL) 12.5 mg tablet Take 12.5 mg by mouth daily at bedtime    ibuprofen (MOTRIN) 600 mg tablet as needed    losartan (COZAAR) 50 mg tablet Take 50 mg by mouth daily at bedtime    metFORMIN HCl  MG/5ML SRER in the morning    Multiple Vitamins-Minerals (Multivitamin Adults) TABS Take by mouth    simvastatin (ZOCOR) 20 mg tablet Take 20 mg by mouth daily at bedtime    traMADol (Ultram) 50 mg tablet Take 1 tablet (50 mg total) by mouth every 6 (six) hours as needed for moderate pain for up to 18 doses    tretinoin (Retin-A) 0.025 % cream Apply topically daily at bedtime    zolpidem (AMBIEN CR) 12.5 MG CR tablet Take 12.5 mg by mouth as needed     No current facility-administered medications on file prior to visit.     No family history on file.  Past Medical History:   Diagnosis Date    Anxiety     Hypertension 2/23/2024    Sleep apnea 2/23/2024    no CPAP use     Social History     Socioeconomic History    Marital status: /Civil Union     Spouse name: Not on file    Number of  children: Not on file    Years of education: Not on file    Highest education level: Not on file   Occupational History    Not on file   Tobacco Use    Smoking status: Former     Average packs/day: 2.0 packs/day for 25.0 years (50.0 ttl pk-yrs)     Types: Cigarettes     Start date: 1970     Quit date:      Years since quittin.2    Smokeless tobacco: Never   Vaping Use    Vaping status: Never Used   Substance and Sexual Activity    Alcohol use: Yes     Alcohol/week: 3.0 standard drinks of alcohol     Types: 3 Glasses of wine per week    Drug use: Never    Sexual activity: Not Currently     Partners: Male     Birth control/protection: Female Sterilization   Other Topics Concern    Not on file   Social History Narrative    Not on file     Social Determinants of Health     Financial Resource Strain: Not At Risk (10/14/2023)    Received from [facility]    Financial Resource Strain     In the last 12 months did you skip medications to save money?: No     In the last 12 months, was there a time when you needed to see a doctor but could not because of cost?: No   Food Insecurity: Not At Risk (10/14/2023)    Received from [facility]    Food Insecurity     In the last 12 months did you ever eat less than you felt you should because there wasn't enough money for food?: No   Transportation Needs: Not At Risk (10/14/2023)    Received from [facility]    Transporation     In the last 12 months, have you ever had to go without healthcare because you didn't have a way to get there?: No   Physical Activity: Unknown (3/15/2023)    Received from [facility]    Exercise Vital Sign     Days of Exercise per Week: 3 days     Minutes of Exercise per Session: Not on file   Stress: Stress Concern Present (3/15/2023)    Received from [facility]    [place] [employer] Occupational Health - Occupational  Stress Questionnaire     Feeling of Stress : To some extent   Social Connections: Not At Risk (10/14/2023)    Received from [facility]    Social Connections     Do you often feel lonely?: No   Intimate Partner Violence: Not on file   Housing Stability: Not At Risk (10/14/2023)    Received from [facility]    Housing Stability     Are you worried that in the next 2 months you may not have stable housing?: No     Past Surgical History:   Procedure Laterality Date    COLONOSCOPY  2022    LYMPH NODE BIOPSY Left 2/23/2024    Procedure: SENTINEL LYMPH NODE BIOPSY; 0800 NUC MED;  Surgeon: Gisela Chilel MD;  Location:  MAIN OR;  Service: Surgical Oncology    SKIN LESION EXCISION Left 2/23/2024    Procedure: WIDE LOCAL EXCISION OF LEFT FOOT MELANOMA;  Surgeon: Gisela Chilel MD;  Location:  MAIN OR;  Service: Surgical Oncology    SPLIT THICKNESS SKIN GRAFT Left 2/23/2024    Procedure: RECONSTRUCTION OF LEFT DORSAL FOOT WOUND AFTER RESECTION OF MELANOMA WITH  SKIN GRAFT SPLIT THICKNESS WITH VAC PLACEMENT, POSSIBLE INTEGRA.;  Surgeon: Efren Sutton MD;  Location:  MAIN OR;  Service: Plastics    TONSILLECTOMY  1958    UPPER GASTROINTESTINAL ENDOSCOPY  2010    WRIST FRACTURE SURGERY Right          Review of Systems   All other systems reviewed and are negative.        Objective:      There were no vitals taken for this visit.         Physical Exam  Constitutional:       Appearance: Normal appearance. She is well-developed.   HENT:      Head: Normocephalic and atraumatic.   Eyes:      Conjunctiva/sclera: Conjunctivae normal.   Pulmonary:      Effort: Pulmonary effort is normal.   Musculoskeletal:         General: Normal range of motion.      Cervical back: Normal range of motion.   Skin:     General: Skin is warm and dry.      Comments: Left foot skin graft has integrated nicely, see picture in media.    Neurological:      Mental Status: She is alert and oriented  to person, place, and time.   Psychiatric:         Mood and Affect: Mood normal.         Behavior: Behavior normal.

## 2024-05-09 ENCOUNTER — TELEPHONE (OUTPATIENT)
Age: 69
End: 2024-05-09

## 2024-05-09 NOTE — TELEPHONE ENCOUNTER
Received a call from [facility] that they need an order to be signed that they have sent over.  The order is to discontinue OT/home care per patient request.  She asked that it be signed asap and faxed back to [phone] AND[facility] avend[facility]

## 2024-05-10 ENCOUNTER — OFFICE VISIT (OUTPATIENT)
Dept: PLASTIC SURGERY | Facility: HOSPITAL | Age: 69
End: 2024-05-10

## 2024-05-10 DIAGNOSIS — C43.72 MALIGNANT MELANOMA OF LEFT FOOT (HCC): Primary | ICD-10-CM

## 2024-05-10 PROCEDURE — 99024 POSTOP FOLLOW-UP VISIT: CPT | Performed by: PHYSICIAN ASSISTANT

## 2024-05-10 NOTE — PROGRESS NOTES
Assessment/Plan:     Diagnoses and all orders for this visit:    Malignant melanoma of left foot (HCC)  She underwent reconstruction of left dorsal foot wound with split thickness skin graft for melanoma on 2/23 by Dr. Sutton. Graft is well integrated. She has been using Aquafor & scar tape. We will see her back in 3 months.        Subjective:      Patient ID: Lisa Ramirez is a 68 y.o. female.    HPI    Pt is here for a post-op visit. She underwent reconstruction of left dorsal foot wound with split thickness skin graft for melanoma on 2/23 by Dr. Sutton. Pt states she is doing well.        Patient Active Problem List   Diagnosis    Malignant melanoma of left foot (HCC)    Hypertension    Sleep apnea    Hyperlipidemia    Diabetes mellitus, type 2 (HCC)     No Known Allergies  Current Outpatient Medications on File Prior to Visit   Medication Sig    escitalopram (LEXAPRO) 10 mg tablet Take 10 mg by mouth daily    hydrochlorothiazide (HYDRODIURIL) 12.5 mg tablet Take 12.5 mg by mouth daily at bedtime    ibuprofen (MOTRIN) 600 mg tablet as needed    losartan (COZAAR) 50 mg tablet Take 50 mg by mouth daily at bedtime    metFORMIN HCl  MG/5ML SRER in the morning    Multiple Vitamins-Minerals (Multivitamin Adults) TABS Take by mouth    simvastatin (ZOCOR) 20 mg tablet Take 20 mg by mouth daily at bedtime    traMADol (Ultram) 50 mg tablet Take 1 tablet (50 mg total) by mouth every 6 (six) hours as needed for moderate pain for up to 18 doses    tretinoin (Retin-A) 0.025 % cream Apply topically daily at bedtime    zolpidem (AMBIEN CR) 12.5 MG CR tablet Take 12.5 mg by mouth as needed     No current facility-administered medications on file prior to visit.     No family history on file.  Past Medical History:   Diagnosis Date    Anxiety     Hypertension 2/23/2024    Sleep apnea 2/23/2024    no CPAP use     Social History     Socioeconomic History    Marital status: /Civil Union     Spouse name: Not on file     Number of children: Not on file    Years of education: Not on file    Highest education level: Not on file   Occupational History    Not on file   Tobacco Use    Smoking status: Former     Average packs/day: 2.0 packs/day for 25.0 years (50.0 ttl pk-yrs)     Types: Cigarettes     Start date: 1970     Quit date:      Years since quittin.3    Smokeless tobacco: Never   Vaping Use    Vaping status: Never Used   Substance and Sexual Activity    Alcohol use: Yes     Alcohol/week: 3.0 standard drinks of alcohol     Types: 3 Glasses of wine per week    Drug use: Never    Sexual activity: Not Currently     Partners: Male     Birth control/protection: Female Sterilization   Other Topics Concern    Not on file   Social History Narrative    Not on file     Social Determinants of Health     Financial Resource Strain: Not At Risk (10/14/2023)    Received from [facility]    Financial Resource Strain     In the last 12 months did you skip medications to save money?: No     In the last 12 months, was there a time when you needed to see a doctor but could not because of cost?: No   Food Insecurity: Not At Risk (10/14/2023)    Received from [facility]    Food Insecurity     In the last 12 months did you ever eat less than you felt you should because there wasn't enough money for food?: No   Transportation Needs: Not At Risk (10/14/2023)    Received from [facility]    Transporation     In the last 12 months, have you ever had to go without healthcare because you didn't have a way to get there?: No   Physical Activity: Unknown (3/15/2023)    Received from [facility]    Exercise Vital Sign     Days of Exercise per Week: 3 days     Minutes of Exercise per Session: Not on file   Stress: Stress Concern Present (3/15/2023)    Received from [facility]    [place] of Occupational Health -  Occupational Stress Questionnaire     Feeling of Stress : To some extent   Social Connections: Not At Risk (10/14/2023)    Received from [facility]    Social Connections     Do you often feel lonely?: No   Intimate Partner Violence: Not on file   Housing Stability: Not At Risk (10/14/2023)    Received from [facility]    Housing Stability     Are you worried that in the next 2 months you may not have stable housing?: No     Past Surgical History:   Procedure Laterality Date    COLONOSCOPY  2022    LYMPH NODE BIOPSY Left 2/23/2024    Procedure: SENTINEL LYMPH NODE BIOPSY; 0800 NUC MED;  Surgeon: Gisela Chilel MD;  Location:  MAIN OR;  Service: Surgical Oncology    SKIN LESION EXCISION Left 2/23/2024    Procedure: WIDE LOCAL EXCISION OF LEFT FOOT MELANOMA;  Surgeon: Gisela Chilel MD;  Location:  MAIN OR;  Service: Surgical Oncology    SPLIT THICKNESS SKIN GRAFT Left 2/23/2024    Procedure: RECONSTRUCTION OF LEFT DORSAL FOOT WOUND AFTER RESECTION OF MELANOMA WITH  SKIN GRAFT SPLIT THICKNESS WITH VAC PLACEMENT, POSSIBLE INTEGRA.;  Surgeon: Efren Sutton MD;  Location:  MAIN OR;  Service: Plastics    TONSILLECTOMY  1958    UPPER GASTROINTESTINAL ENDOSCOPY  2010    WRIST FRACTURE SURGERY Right          Review of Systems   All other systems reviewed and are negative.        Objective:      There were no vitals taken for this visit.         Physical Exam  Constitutional:       Appearance: Normal appearance. She is well-developed.   HENT:      Head: Normocephalic and atraumatic.   Eyes:      Conjunctiva/sclera: Conjunctivae normal.   Pulmonary:      Effort: Pulmonary effort is normal.   Musculoskeletal:         General: Normal range of motion.      Cervical back: Normal range of motion.   Skin:     General: Skin is warm and dry.      Comments: Skin graft is intact & well integrated, see picture in media.   Neurological:      Mental Status: She is alert and  oriented to person, place, and time.   Psychiatric:         Mood and Affect: Mood normal.         Behavior: Behavior normal.

## 2024-05-14 ENCOUNTER — TELEPHONE (OUTPATIENT)
Age: 69
End: 2024-05-14

## 2024-05-14 NOTE — TELEPHONE ENCOUNTER
Rec'd call from [facility] upon receipt of orders they had faxed to office on : 3/18, 3/22 and 5/2.    If they haven't been received or their are any issues/concerns, please contact them at [phone].    Thank  you.

## 2024-06-04 NOTE — TELEPHONE ENCOUNTER
Received a call from emma at [facility] asking about orders that were sent over in march.  She is going to re fax orders to [phone]

## 2024-07-31 ENCOUNTER — TELEPHONE (OUTPATIENT)
Age: 69
End: 2024-07-31

## 2024-07-31 NOTE — TELEPHONE ENCOUNTER
Mariposa from Dr. Vazquez's office is requesting notes from office visit & surgery with Dr. Chilel. Please fax to [phone]

## 2024-08-01 DIAGNOSIS — Z00.6 ENCOUNTER FOR EXAMINATION FOR NORMAL COMPARISON OR CONTROL IN CLINICAL RESEARCH PROGRAM: ICD-10-CM

## 2024-08-15 ENCOUNTER — APPOINTMENT (OUTPATIENT)
Dept: LAB | Facility: CLINIC | Age: 69
End: 2024-08-15

## 2024-08-15 DIAGNOSIS — Z00.6 ENCOUNTER FOR EXAMINATION FOR NORMAL COMPARISON OR CONTROL IN CLINICAL RESEARCH PROGRAM: ICD-10-CM

## 2024-08-15 PROCEDURE — 36415 COLL VENOUS BLD VENIPUNCTURE: CPT

## 2024-08-23 ENCOUNTER — OFFICE VISIT (OUTPATIENT)
Dept: PLASTIC SURGERY | Facility: HOSPITAL | Age: 69
End: 2024-08-23
Payer: COMMERCIAL

## 2024-08-23 DIAGNOSIS — C43.72 MALIGNANT MELANOMA OF LEFT FOOT (HCC): Primary | ICD-10-CM

## 2024-08-23 PROCEDURE — 99213 OFFICE O/P EST LOW 20 MIN: CPT | Performed by: PHYSICIAN ASSISTANT

## 2024-08-23 NOTE — PROGRESS NOTES
Assessment/Plan:     Diagnoses and all orders for this visit:    Malignant melanoma of left foot (HCC)  She underwent reconstruction of left dorsal foot wound with split thickness skin graft for melanoma on 2/23 by Dr. Sutton. Graft is well integrated. We will see her back as needed.         Subjective:      Patient ID: Lisa Ramirez is a 68 y.o. female.    HPI    Pt is here for a post-op visit. She underwent reconstruction of left dorsal foot wound with split thickness skin graft for melanoma on 2/23 by Dr. Sutton. Pt states she still has hypersensitivity over the graft.        Patient Active Problem List   Diagnosis    Malignant melanoma of left foot (HCC)    Hypertension    Sleep apnea    Hyperlipidemia    Diabetes mellitus, type 2 (HCC)     No Known Allergies  Current Outpatient Medications on File Prior to Visit   Medication Sig    escitalopram (LEXAPRO) 10 mg tablet Take 10 mg by mouth daily    hydrochlorothiazide (HYDRODIURIL) 12.5 mg tablet Take 12.5 mg by mouth daily at bedtime    ibuprofen (MOTRIN) 600 mg tablet as needed    losartan (COZAAR) 50 mg tablet Take 50 mg by mouth daily at bedtime    metFORMIN HCl  MG/5ML SRER in the morning    Multiple Vitamins-Minerals (Multivitamin Adults) TABS Take by mouth    simvastatin (ZOCOR) 20 mg tablet Take 20 mg by mouth daily at bedtime    traMADol (Ultram) 50 mg tablet Take 1 tablet (50 mg total) by mouth every 6 (six) hours as needed for moderate pain for up to 18 doses    tretinoin (Retin-A) 0.025 % cream Apply topically daily at bedtime    zolpidem (AMBIEN CR) 12.5 MG CR tablet Take 12.5 mg by mouth as needed     No current facility-administered medications on file prior to visit.     No family history on file.  Past Medical History:   Diagnosis Date    Anxiety     Hypertension 2/23/2024    Sleep apnea 2/23/2024    no CPAP use     Social History     Socioeconomic History    Marital status: /Civil Union     Spouse name: Not on file    Number of  children: Not on file    Years of education: Not on file    Highest education level: Not on file   Occupational History    Not on file   Tobacco Use    Smoking status: Former     Average packs/day: 2.0 packs/day for 25.0 years (50.0 ttl pk-yrs)     Types: Cigarettes     Start date: 1970     Quit date:      Years since quittin.6    Smokeless tobacco: Never   Vaping Use    Vaping status: Never Used   Substance and Sexual Activity    Alcohol use: Yes     Alcohol/week: 3.0 standard drinks of alcohol     Types: 3 Glasses of wine per week    Drug use: Never    Sexual activity: Not Currently     Partners: Male     Birth control/protection: Female Sterilization   Other Topics Concern    Not on file   Social History Narrative    Not on file     Social Determinants of Health     Financial Resource Strain: Not At Risk (2024)    Received from [facility]    Financial Resource Strain     In the last 12 months did you skip medications to save money?: No     In the last 12 months, was there a time when you needed to see a doctor but could not because of cost?: No   Food Insecurity: Not At Risk (2024)    Received from [facility]    Food Insecurity     In the last 12 months did you ever eat less than you felt you should because there wasn't enough money for food?: No   Transportation Needs: Not At Risk (2024)    Received from [facility]    Transporation     In the last 12 months, have you ever had to go without healthcare because you didn't have a way to get there?: No   Physical Activity: Unknown (3/15/2023)    Received from [facility], [facility]    Exercise Vital Sign     Days of Exercise per Week: 3 days     Minutes of Exercise per Session: Not on file   Stress: Stress Concern Present (2024)    Received from [facility]  Occupational Health - Occupational Stress Questionnaire     Feeling of Stress : Rather much   Social Connections: Not At Risk (6/29/2024)    [facility]    Social Connections     Do you often feel lonely?: No   Intimate Partner Violence: Not on file   Housing Stability: Not At Risk (6/29/2024)    [facility]    Housing Stability     Are you worried that in the next 2 months you may not have stable housing?: No     Past Surgical History:   Procedure Laterality Date    COLONOSCOPY  2022    LYMPH NODE BIOPSY Left 2/23/2024    Procedure: SENTINEL LYMPH NODE BIOPSY; 0800 NUC MED;  Surgeon: Gisela Chilel MD;  Location:  MAIN OR;  Service: Surgical Oncology    SKIN LESION EXCISION Left 2/23/2024    Procedure: WIDE LOCAL EXCISION OF LEFT FOOT MELANOMA;  Surgeon: Gisela Chilel MD;  Location:  MAIN OR;  Service: Surgical Oncology    SPLIT THICKNESS SKIN GRAFT Left 2/23/2024    Procedure: RECONSTRUCTION OF LEFT DORSAL FOOT WOUND AFTER RESECTION OF MELANOMA WITH  SKIN GRAFT SPLIT THICKNESS WITH VAC PLACEMENT, POSSIBLE INTEGRA.;  Surgeon: Efren Sutton MD;  Location:  MAIN OR;  Service: Plastics    TONSILLECTOMY  1958    UPPER GASTROINTESTINAL ENDOSCOPY  2010    WRIST FRACTURE SURGERY Right          Review of Systems   All other systems reviewed and are negative.        Objective:      There were no vitals taken for this visit.         Physical Exam  Constitutional:       Appearance: Normal appearance. She is well-developed.   HENT:      Head: Normocephalic and atraumatic.   Eyes:      Conjunctiva/sclera: Conjunctivae normal.   Pulmonary:      Effort: Pulmonary effort is normal.   Musculoskeletal:         General: Normal range of motion.      Cervical back: Normal range of motion.   Skin:     General: Skin is warm and dry.      Comments: Left foot graft is fully integrated, see picture in media.    Neurological:      Mental Status: She  is alert and oriented to person, place, and time.   Psychiatric:         Mood and Affect: Mood normal.         Behavior: Behavior normal.

## 2024-08-28 LAB
APOB+LDLR+PCSK9 GENE MUT ANL BLD/T: NOT DETECTED
BRCA1+BRCA2 DEL+DUP + FULL MUT ANL BLD/T: NOT DETECTED
MLH1+MSH2+MSH6+PMS2 GN DEL+DUP+FUL M: NOT DETECTED

## 2024-09-04 PROBLEM — Z85.820 ENCOUNTER FOR FOLLOW-UP SURVEILLANCE OF MELANOMA: Status: ACTIVE | Noted: 2024-09-04

## 2024-09-04 PROBLEM — Z08 ENCOUNTER FOR FOLLOW-UP SURVEILLANCE OF MELANOMA: Status: ACTIVE | Noted: 2024-09-04

## 2024-09-04 PROBLEM — Z85.820 HISTORY OF MALIGNANT MELANOMA: Status: ACTIVE | Noted: 2024-01-12

## 2024-09-30 ENCOUNTER — OFFICE VISIT (OUTPATIENT)
Dept: SURGICAL ONCOLOGY | Facility: CLINIC | Age: 69
End: 2024-09-30
Payer: COMMERCIAL

## 2024-09-30 VITALS
TEMPERATURE: 97.4 F | BODY MASS INDEX: 30.79 KG/M2 | DIASTOLIC BLOOD PRESSURE: 82 MMHG | WEIGHT: 184.8 LBS | SYSTOLIC BLOOD PRESSURE: 122 MMHG | HEART RATE: 97 BPM | OXYGEN SATURATION: 97 % | HEIGHT: 65 IN

## 2024-09-30 DIAGNOSIS — Z85.820 ENCOUNTER FOR FOLLOW-UP SURVEILLANCE OF MELANOMA: Primary | ICD-10-CM

## 2024-09-30 DIAGNOSIS — Z08 ENCOUNTER FOR FOLLOW-UP SURVEILLANCE OF MELANOMA: Primary | ICD-10-CM

## 2024-09-30 DIAGNOSIS — Z85.820 HISTORY OF MALIGNANT MELANOMA: ICD-10-CM

## 2024-09-30 PROCEDURE — 99213 OFFICE O/P EST LOW 20 MIN: CPT

## 2024-09-30 PROCEDURE — G2211 COMPLEX E/M VISIT ADD ON: HCPCS

## 2024-09-30 RX ORDER — LORAZEPAM 0.5 MG/1
0.5 TABLET ORAL
COMMUNITY
Start: 2024-07-02

## 2024-09-30 RX ORDER — MINOXIDIL 2.5 MG/1
1.25 TABLET ORAL DAILY
COMMUNITY
Start: 2024-09-25 | End: 2025-09-25

## 2024-09-30 RX ORDER — TRETINOIN 0.5 MG/G
CREAM TOPICAL
COMMUNITY
Start: 2024-07-15

## 2024-09-30 RX ORDER — METFORMIN HCL 500 MG
TABLET, EXTENDED RELEASE 24 HR ORAL
COMMUNITY
Start: 2024-08-08

## 2024-09-30 NOTE — ASSESSMENT & PLAN NOTE
No evidence of disease recurrence on physical exam, and patient is asymptomatic.  I will see her again in 6 months for another clinical edam.

## 2024-09-30 NOTE — PROGRESS NOTES
Surgical Oncology Follow Up       [facility] [place]  [address] [facility] [address]  [phone]    Lisa Ramirez  1955  [id number]  [facility] [place]  [address] [facility] [address]  [phone]    1. Encounter for follow-up surveillance of melanoma  2. History of malignant melanoma  Assessment & Plan:  No evidence of disease recurrence on physical exam, and patient is asymptomatic.  I will see her again in 6 months for another clinical edam.         Chief Complaint   Patient presents with    Follow-up       Return in about 6 months (around 3/30/2025) for Office Visit.      Oncology History   History of malignant melanoma   12/21/2023 Biopsy    A. Skin, left dorsal foot, shave biopsy:     MELANOMA (thickness: at least 0.8 mm); transected (see note).     Synoptic report for melanoma of the skin  Thickness: at least 0.8 mm (invasive melanoma broadly transected at deep specimen margin)  Ulceration: not seen  Mitoses: 2/mm2         1/12/2024 Initial Diagnosis    Malignant melanoma of left foot (HCC)     1/16/2024 -  Cancer Staged    Staging form: Melanoma of the Skin, AJCC 8th Edition  - Clinical: Stage IB (cT1b, cN0, cM0) - Signed by Gisela Chilel MD on 1/16/2024 2/23/2024 Surgery    A. Lymph node, sentinel node #1:      One lymph node; metastatic melanoma not definitively seen (0/1) (see note).        B. Skin, left foot, excision:     -Residual MELANOMA (thickness: 1.0 mm); not seen at examined inked specimen margins (see note).     -Prior procedure site changes present.     Note: SOX10, MART-1, HMB45, and PRAME immunostains were reviewed. Please see synoptic report for additional details.      Synoptic report for melanoma of the skin  Thickness: 1.0 mm  Ulceration: not seen  Anatomic (Castillo) level: IV  Type: cannot be  determined  Mitoses: 0/mm2  Microsatellites: not seen  Lymphovascular invasion: not seen  Neurotropism: not seen  Tumor regression: not seen  Tumor-infiltrating lymphocytes (TIL): cannot be determined  Margin assessment: not seen at examined inked specimen margins  Pathologic stage: pT1b  Associated nevus: not seen     2/23/2024 -  Cancer Staged    Staging form: Melanoma of the Skin, AJCC 8th Edition  - Pathologic stage from 2/23/2024: Stage IA (pT1b, pN0, cM0) - Signed by ERVIN Jarrell on 9/4/2024  Stage prefix: Initial diagnosis             History of Present Illness: This is a 67 y/o female who returns to the office today in follow-up for her recently treated melanoma.  She is currently 7 months s/p wide excision with sentinel lymph node biopsy. She reports some occasional sensitivity of her left foot, and at times notices some radiating pain below her inguinal incision, but states the discomfort is mild.  Denies any left or foot swelling or dysfunction. She denies any new lumps or lesions, and has established routine care with a new dermatologist.  She denies any headaches, dizziness, weight loss or abdominal pain.      Review of Systems   Constitutional:  Negative for activity change, appetite change, fatigue and unexpected weight change.   HENT: Negative.     Respiratory: Negative.     Cardiovascular: Negative.    Gastrointestinal: Negative.  Negative for abdominal pain.   Musculoskeletal: Negative.    Skin: Negative.  Negative for color change and wound.   Neurological: Negative.  Negative for dizziness and headaches.   Hematological: Negative.  Negative for adenopathy.   Psychiatric/Behavioral: Negative.               Patient Active Problem List   Diagnosis    History of malignant melanoma    Hypertension    Sleep apnea    Hyperlipidemia    Diabetes mellitus, type 2 (HCC)    Encounter for follow-up surveillance of melanoma     Past Medical History:   Diagnosis Date    Anxiety     Hypertension  2024    Sleep apnea 2024    no CPAP use     Past Surgical History:   Procedure Laterality Date    COLONOSCOPY      LYMPH NODE BIOPSY Left 2024    Procedure: SENTINEL LYMPH NODE BIOPSY; 0800 NUC MED;  Surgeon: Gisela Chilel MD;  Location:  MAIN OR;  Service: Surgical Oncology    SKIN LESION EXCISION Left 2024    Procedure: WIDE LOCAL EXCISION OF LEFT FOOT MELANOMA;  Surgeon: Gisela Chilel MD;  Location: [facility] MAIN OR;  Service: Surgical Oncology    SPLIT THICKNESS SKIN GRAFT Left 2024    Procedure: RECONSTRUCTION OF LEFT DORSAL FOOT WOUND AFTER RESECTION OF MELANOMA WITH  SKIN GRAFT SPLIT THICKNESS WITH VAC PLACEMENT, POSSIBLE INTEGRA.;  Surgeon: Efren Sutton MD;  Location:  MAIN OR;  Service: Plastics    TONSILLECTOMY      UPPER GASTROINTESTINAL ENDOSCOPY  2010    WRIST FRACTURE SURGERY Right      No family history on file.  Social History     Socioeconomic History    Marital status: /Civil Union     Spouse name: Not on file    Number of children: Not on file    Years of education: Not on file    Highest education level: Not on file   Occupational History    Not on file   Tobacco Use    Smoking status: Former     Average packs/day: 2.0 packs/day for 25.0 years (50.0 ttl pk-yrs)     Types: Cigarettes     Start date: 1970     Quit date:      Years since quittin.7    Smokeless tobacco: Never   Vaping Use    Vaping status: Never Used   Substance and Sexual Activity    Alcohol use: Yes     Alcohol/week: 3.0 standard drinks of alcohol     Types: 3 Glasses of wine per week    Drug use: Never    Sexual activity: Not Currently     Partners: Male     Birth control/protection: Female Sterilization   Other Topics Concern    Not on file   Social History Narrative    Not on file     Social Determinants of Health     Financial Resource Strain: Not At Risk (2024)    Received from [facility]    Financial Resource Strain     In the last 12  months did you skip medications to save money?: No     In the last 12 months, was there a time when you needed to see a doctor but could not because of cost?: No   Food Insecurity: Not At Risk (6/29/2024)    Received from [facility]    Food Insecurity     In the last 12 months did you ever eat less than you felt you should because there wasn't enough money for food?: No   Transportation Needs: Not At Risk (6/29/2024)    Received from [facility]    Transporation     In the last 12 months, have you ever had to go without healthcare because you didn't have a way to get there?: No   Physical Activity: Unknown (3/15/2023)    Received from [facility], [facility]    Exercise Vital Sign     Days of Exercise per Week: 3 days     Minutes of Exercise per Session: Not on file   Stress: Stress Concern Present (6/29/2024)    Received from [facility]    [employer] of Occupational Health - Occupational Stress Questionnaire     Feeling of Stress : Rather much   Social Connections: Not At Risk (6/29/2024)    Received from [facility]    Social Connections     Do you often feel lonely?: No   Intimate Partner Violence: Not on file   Housing Stability: Not At Risk (6/29/2024)    Received from [facility]    Housing Stability     Are you worried that in the next 2 months you may not have stable housing?: No       Current Outpatient Medications:     escitalopram (LEXAPRO) 10 mg tablet, Take 10 mg by mouth daily, Disp: , Rfl:     hydrochlorothiazide (HYDRODIURIL) 12.5 mg tablet, Take 12.5 mg by mouth daily at bedtime, Disp: , Rfl:     LORazepam (ATIVAN) 0.5 mg tablet, Take 0.5 mg by mouth, Disp: , Rfl:     losartan (COZAAR) 50 mg tablet, Take 50 mg by mouth daily at bedtime, Disp: , Rfl:     metFORMIN (GLUCOPHAGE-XR) 500 mg 24 hr tablet, take 1 tablet by  mouth once daily with supper, Disp: , Rfl:     metFORMIN HCl  MG/5ML SRER, in the morning, Disp: , Rfl:     minoxidil (LONITEN) 2.5 mg tablet, Take 1.25 mg by mouth daily, Disp: , Rfl:     Multiple Vitamins-Minerals (Multivitamin Adults) TABS, Take by mouth, Disp: , Rfl:     Retin-A 0.05 % cream, APPLY CREAM TOPICALLY TO AFFECTED AREA IN THE EVENING, Disp: , Rfl:     simvastatin (ZOCOR) 20 mg tablet, Take 20 mg by mouth daily at bedtime, Disp: , Rfl:     tretinoin (Retin-A) 0.025 % cream, Apply topically daily at bedtime, Disp: , Rfl:     zolpidem (AMBIEN CR) 12.5 MG CR tablet, Take 12.5 mg by mouth as needed, Disp: , Rfl:     ibuprofen (MOTRIN) 600 mg tablet, as needed, Disp: , Rfl:     traMADol (Ultram) 50 mg tablet, Take 1 tablet (50 mg total) by mouth every 6 (six) hours as needed for moderate pain for up to 18 doses, Disp: 18 tablet, Rfl: 0  No Known Allergies  Vitals:    09/30/24 1128   BP: 122/82   Pulse: 97   Temp: (!) 97.4 °F (36.3 °C)   SpO2: 97%       Physical Exam  Vitals reviewed.   Constitutional:       General: She is not in acute distress.     Appearance: Normal appearance. She is normal weight. She is not ill-appearing or toxic-appearing.   HENT:      Head: Normocephalic and atraumatic.      Nose: Nose normal.      Mouth/Throat:      Mouth: Mucous membranes are moist.   Eyes:      General: No scleral icterus.  Cardiovascular:      Rate and Rhythm: Normal rate.   Pulmonary:      Effort: Pulmonary effort is normal.   Musculoskeletal:         General: No swelling or tenderness. Normal range of motion.      Cervical back: Normal range of motion and neck supple.   Lymphadenopathy:      Lower Body: No left inguinal adenopathy.   Skin:     General: Skin is warm and dry.      Coloration: Skin is not jaundiced.      Findings: No erythema or lesion.             Comments: Left dorsal foot scar is well-healed, without any evidence of nodularity or pigmentation.  No in-transit lesions present on exam.    Neurological:      General: No focal deficit present.      Mental Status: She is alert and oriented to person, place, and time.   Psychiatric:         Mood and Affect: Mood normal.         Behavior: Behavior normal.         Thought Content: Thought content normal.         Judgment: Judgment normal.

## 2025-03-17 ENCOUNTER — TELEPHONE (OUTPATIENT)
Dept: SURGICAL ONCOLOGY | Facility: CLINIC | Age: 70
End: 2025-03-17

## 2025-03-17 NOTE — TELEPHONE ENCOUNTER
Called patient left voice message to reschedule follow up with ERVIN Garcia. Patient cancelled 4/1/25 appointment. Provided office number.

## 2025-06-02 NOTE — PROGRESS NOTES
[facility] - [address], [address], [place] [address]    ASSESSMENT/PLAN: Lisa Ramirez is a 69 y.o.  who presents for pelvic exam for Melanoma    Assessment & Plan  History of malignant melanoma  Lisa with diagnosis of melanoma on foot in .  Treated w/o signs of recurrence or progression but dermatologist recommended vulvar exam given possible vulvar melanoma and patient has not had pelvic exam in years.    Pelvic today w/o concern for melanoma.  Right sebaceous cyst and left small mole noted.  Both appear benign and patient reports have been there for years without issue.  Reviewed if mole changes would recommend removal or biopsy.  Discussed I see no official recommendation for how frequently women with a history of melanoma should have pelvic exams.  Discussed Medicare covers GYN Wellness visit every 2 years, although can be seen more frequently if necessary for a problem.      Will have Lisa scheduled GYN Medicare Wellness in 1 year, will repeat pelvic exam at that time.  Lisa to check with dermatologist if needs annual vulvar exam or every two years sufficient.  I am happy to see as often as necessary.  She expresses understanding and will check at her next visit.       Cervical cancer screening  History negative paps in past - last pap maybe 10 years ago per patient recall.  Will do today and if normal no further pap smear screening necessary.  Patient agrees.    Orders:    Liquid-based pap, screening        Next visit: 1 year Medicare GYN [place]    I have spent  35 minutes in caring for this patient on the day of the visit/encounter specifically for the problem including Prognosis, Risks and benefits of tx options, Instructions for management, Patient and family education, Importance of tx compliance, Risk factor reductions, Impressions, Counseling / Coordination of care, Reviewing/placing orders in the medical record (including tests, medications, and/or  "procedures), and Obtaining or reviewing history  .      CC:  Medicare Gynecologic Wellness Examination    HPI: Lisa Ramirez is a 69 y.o.  who presents for Medicare gynecologic examination.  Lisa presents at the request of her dermatologist due to diagnosis of melanoma last year.  She has not had a gyn exam in probably 10 years or so.    She was diagnosed with melanoma of her foot which was excised 2024.  It was completely excised and lymph node testing was negative per pt.  Dermatologist recommended gyn exam given small risk of vulvar melanoma.    Last gyn 30 years ago.  Did pap smears in past with PCP, last > 10 years ago.  No history abnormal  Denies vaginal bleeding.   47yo when menses stopped.  \"I did have a white bump in my vaginal area years ago, my doctor said it was fine\".    No longer sexually active due to partners age.    Mammo feb with PCP normal  Colonoscopy couple years ago normal        Gyn History       She  reports that she is not currently sexually active and has had partner(s) who are male. She reports using the following method of birth control/protection: Female Sterilization.       Past Medical History:  No date: Anxiety  No date: Cancer (HCC)      Comment:  melanoma  No date: High cholesterol      Comment:  managed by PCP  2024: Hypertension      Comment:  managed by PCP  2024: Melanoma (HCC)  No date: Prediabetes      Comment:  on Metformin, managed by PCP  2024: Sleep apnea      Comment:  no CPAP use     Past Surgical History:  No date: CARPAL TUNNEL RELEASE  No date:  SECTION      Comment:  three c-sections  : COLONOSCOPY  2024: LYMPH NODE BIOPSY; Left      Comment:  Procedure: SENTINEL LYMPH NODE BIOPSY; 0800 NUC MED;                 Surgeon: Gisela Chilel MD;  Location:  MAIN OR;                 Service: Surgical Oncology  1976: SALPINGOOPHORECTOMY; Left      Comment:  open LSO for ectopic pregnancy  2024: SKIN LESION EXCISION; " "Left      Comment:  Procedure: WIDE LOCAL EXCISION OF LEFT FOOT MELANOMA;                 Surgeon: Gisela Chilel MD;  Location: [facility] MAIN OR;                 Service: Surgical Oncology  2024: SPLIT THICKNESS SKIN GRAFT; Left      Comment:  Procedure: RECONSTRUCTION OF LEFT DORSAL FOOT WOUND                AFTER RESECTION OF MELANOMA WITH  SKIN GRAFT SPLIT                THICKNESS WITH VAC PLACEMENT, POSSIBLE INTEGRA.;                 Surgeon: Efren Sutton MD;  Location:  MAIN OR;                 Service: Plastics  : TONSILLECTOMY  2010: UPPER GASTROINTESTINAL ENDOSCOPY  No date: WRIST FRACTURE SURGERY; Right     Family History[1]     Social History[2]     Current Medications[3]    She has no known allergies..    ROS negative except as noted in HPI    Objective:  /68 (BP Location: Left arm, Patient Position: Sitting, Cuff Size: Standard)   Ht 5' 5\" (1.651 m)   Wt 78.7 kg (173 lb 6.4 oz)   BMI 28.86 kg/m²      Physical Exam  Constitutional:       Appearance: Normal appearance.   Genitourinary:     Labia:         Right: Lesion (<1cm oval sebaceous cyst right labia) present.       Vagina: Normal.      Cervix: Normal.      Uterus: Normal. Not enlarged and not tender.       Adnexa:         Right: No mass or tenderness.          Left: No mass or tenderness.        Rectum: No mass or external hemorrhoid. Normal anal tone.       Neurological:      Mental Status: She is alert.     Psychiatric:         Mood and Affect: Mood normal.         Behavior: Behavior normal.                [1]   Family History  Problem Relation Name Age of Onset    Heart disease Mother      Heart disease Father      Asthma Sister      Heart disease Brother     [2]   Social History  Tobacco Use    Smoking status: Former     Average packs/day: 2.0 packs/day for 25.0 years (50.0 ttl pk-yrs)     Types: Cigarettes     Start date: 1970     Quit date:      Years since quittin.4    Smokeless tobacco: Never   Vaping Use    " Vaping status: Never Used   Substance Use Topics    Alcohol use: Yes     Alcohol/week: 3.0 standard drinks of alcohol     Types: 3 Glasses of wine per week    Drug use: Never   [3]   Current Outpatient Medications:     escitalopram (LEXAPRO) 10 mg tablet, Take 10 mg by mouth in the morning., Disp: , Rfl:     hydrochlorothiazide (HYDRODIURIL) 12.5 mg tablet, Take 12.5 mg by mouth daily at bedtime, Disp: , Rfl:     LORazepam (ATIVAN) 0.5 mg tablet, Take 0.5 mg by mouth, Disp: , Rfl:     losartan (COZAAR) 50 mg tablet, Take 50 mg by mouth daily at bedtime, Disp: , Rfl:     metFORMIN (GLUCOPHAGE-XR) 500 mg 24 hr tablet, , Disp: , Rfl:     minoxidil (LONITEN) 2.5 mg tablet, Take 1.25 mg by mouth in the morning., Disp: , Rfl:     Multiple Vitamins-Minerals (Multivitamin Adults) TABS, Take by mouth, Disp: , Rfl:     Retin-A 0.05 % cream, , Disp: , Rfl:     simvastatin (ZOCOR) 20 mg tablet, Take 20 mg by mouth daily at bedtime, Disp: , Rfl:     zolpidem (AMBIEN CR) 12.5 MG CR tablet, Take 12.5 mg by mouth as needed, Disp: , Rfl:

## 2025-06-03 ENCOUNTER — OFFICE VISIT (OUTPATIENT)
Dept: OBGYN CLINIC | Facility: CLINIC | Age: 70
End: 2025-06-03
Payer: COMMERCIAL

## 2025-06-03 VITALS
BODY MASS INDEX: 28.89 KG/M2 | DIASTOLIC BLOOD PRESSURE: 68 MMHG | SYSTOLIC BLOOD PRESSURE: 124 MMHG | WEIGHT: 173.4 LBS | HEIGHT: 65 IN

## 2025-06-03 DIAGNOSIS — Z12.4 CERVICAL CANCER SCREENING: ICD-10-CM

## 2025-06-03 DIAGNOSIS — Z85.820 HISTORY OF MALIGNANT MELANOMA: Primary | ICD-10-CM

## 2025-06-03 PROCEDURE — 99203 OFFICE O/P NEW LOW 30 MIN: CPT | Performed by: OBSTETRICS & GYNECOLOGY

## 2025-06-03 PROCEDURE — G0143 SCR C/V CYTO,THINLAYER,RESCR: HCPCS | Performed by: OBSTETRICS & GYNECOLOGY

## 2025-06-03 NOTE — ASSESSMENT & PLAN NOTE
Lisa with diagnosis of melanoma on foot in 2024.  Treated w/o signs of recurrence or progression but dermatologist recommended vulvar exam given possible vulvar melanoma and patient has not had pelvic exam in years.    Pelvic today w/o concern for melanoma.  Right sebaceous cyst and left small mole noted.  Both appear benign and patient reports have been there for years without issue.  Reviewed if mole changes would recommend removal or biopsy.  Discussed I see no official recommendation for how frequently women with a history of melanoma should have pelvic exams.  Discussed Medicare covers GYN Wellness visit every 2 years, although can be seen more frequently if necessary for a problem.      Will have Lisa scheduled GYN Medicare Wellness in 1 year, will repeat pelvic exam at that time.  Lisa to check with dermatologist if needs annual vulvar exam or every two years sufficient.  I am happy to see as often as necessary.  She expresses understanding and will check at her next visit.

## 2025-06-03 NOTE — LETTER
2025     Ryanne Mcneal MD  [address] [facility] [address]    Patient: Lisa Ramirez   YOB: 1955   Date of Visit: 6/3/2025       Dear Dr. Ryanne Mcneal MD:    Thank you for referring Lisa Ramirez to me for evaluation. Below are my notes for this consultation.    If you have questions, please do not hesitate to call me. I look forward to following your patient along with you.         Sincerely,        Sharifa Soriano MD        CC: No Recipients    Sharifa Soriano MD  2025  9:26 AM  Sign when Signing Visit  [facility] - [address], [address], [place] [address]    ASSESSMENT/PLAN: Lisa Ramirez is a 69 y.o.  who presents for pelvic exam for Melanoma    Assessment & Plan  History of malignant melanoma  Lisa with diagnosis of melanoma on foot in .  Treated w/o signs of recurrence or progression but dermatologist recommended vulvar exam given possible vulvar melanoma and patient has not had pelvic exam in years.    Pelvic today w/o concern for melanoma.  Right sebaceous cyst and left small mole noted.  Both appear benign and patient reports have been there for years without issue.  Reviewed if mole changes would recommend removal or biopsy.  Discussed I see no official recommendation for how frequently women with a history of melanoma should have pelvic exams.  Discussed Medicare covers GYN Wellness visit every 2 years, although can be seen more frequently if necessary for a problem.      Will have Lisa scheduled GYN Medicare Wellness in 1 year, will repeat pelvic exam at that time.  Lisa to check with dermatologist if needs annual vulvar exam or every two years sufficient.  I am happy to see as often as necessary.  She expresses understanding and will check at her next visit.       Cervical cancer screening  History negative paps in past - last pap maybe 10 years ago per patient recall.  Will do today and  "if normal no further pap smear screening necessary.  Patient agrees.    Orders:  •  Liquid-based pap, screening        Next visit: 1 year Medicare GYN [place]      CC:  Medicare Gynecologic Wellness Examination    HPI: Lisa Ramirez is a 69 y.o.  who presents for Medicare gynecologic examination.  Lisa presents at the request of her dermatologist due to diagnosis of melanoma last year.  She has not had a gyn exam in probably 10 years or so.    She was diagnosed with melanoma of her foot which was excised 2024.  It was completely excised and lymph node testing was negative per pt.  Dermatologist recommended gyn exam given small risk of vulvar melanoma.    Last gyn 30 years ago.  Did pap smears in past with PCP, last > 10 years ago.  No history abnormal  Denies vaginal bleeding.   47yo when menses stopped.  \"I did have a white bump in my vaginal area years ago, my doctor said it was fine\".    No longer sexually active due to partners age.    Mammo feb with PCP normal  Colonoscopy couple years ago normal        Gyn History       She  reports that she is not currently sexually active and has had partner(s) who are male. She reports using the following method of birth control/protection: Female Sterilization.       Past Medical History:  No date: Anxiety  No date: Cancer (HCC)      Comment:  melanoma  No date: High cholesterol      Comment:  managed by PCP  2024: Hypertension      Comment:  managed by PCP  2024: Melanoma (HCC)  No date: Prediabetes      Comment:  on Metformin, managed by PCP  2024: Sleep apnea      Comment:  no CPAP use     Past Surgical History:  No date: CARPAL TUNNEL RELEASE  No date:  SECTION      Comment:  three c-sections  : COLONOSCOPY  2024: LYMPH NODE BIOPSY; Left      Comment:  Procedure: SENTINEL LYMPH NODE BIOPSY; 0800 NUC MED;                 Surgeon: Gisela Chilel MD;  Location:  MAIN OR;                 Service: Surgical Oncology  1976: " "SALPINGOOPHORECTOMY; Left      Comment:  open LSO for ectopic pregnancy  02/23/2024: SKIN LESION EXCISION; Left      Comment:  Procedure: WIDE LOCAL EXCISION OF LEFT FOOT MELANOMA;                 Surgeon: Gisela Chilel MD;  Location: [facility]IN OR;                 Service: Surgical Oncology  02/23/2024: SPLIT THICKNESS SKIN GRAFT; Left      Comment:  Procedure: RECONSTRUCTION OF LEFT DORSAL FOOT WOUND                AFTER RESECTION OF MELANOMA WITH  SKIN GRAFT SPLIT                THICKNESS WITH VAC PLACEMENT, POSSIBLE INTEGRA.;                 Surgeon: Efren Sutton MD;  Location: [facility]IN OR;                 Service: Plastics  1958: TONSILLECTOMY  2010: UPPER GASTROINTESTINAL ENDOSCOPY  No date: WRIST FRACTURE SURGERY; Right     Family History[1]     Social History[2]     Current Medications[3]    She has no known allergies..    ROS negative except as noted in HPI    Objective:  /68 (BP Location: Left arm, Patient Position: Sitting, Cuff Size: Standard)   Ht 5' 5\" (1.651 m)   Wt 78.7 kg (173 lb 6.4 oz)   BMI 28.86 kg/m²      Physical Exam  Constitutional:       Appearance: Normal appearance.   Genitourinary:     Labia:         Right: Lesion (<1cm oval sebaceous cyst right labia) present.       Vagina: Normal.      Cervix: Normal.      Uterus: Normal. Not enlarged and not tender.       Adnexa:         Right: No mass or tenderness.          Left: No mass or tenderness.        Rectum: No mass or external hemorrhoid. Normal anal tone.       Neurological:      Mental Status: She is alert.     Psychiatric:         Mood and Affect: Mood normal.         Behavior: Behavior normal.                  [1]  Family History  Problem Relation Name Age of Onset   • Heart disease Mother     • Heart disease Father     • Asthma Sister     • Heart disease Brother     [2]  Social History  Tobacco Use   • Smoking status: Former     Average packs/day: 2.0 packs/day for 25.0 years (50.0 ttl pk-yrs)     Types: Cigarettes     " Start date: 1970     Quit date:      Years since quittin.4   • Smokeless tobacco: Never   Vaping Use   • Vaping status: Never Used   Substance Use Topics   • Alcohol use: Yes     Alcohol/week: 3.0 standard drinks of alcohol     Types: 3 Glasses of wine per week   • Drug use: Never   [3]    Current Outpatient Medications:   •  escitalopram (LEXAPRO) 10 mg tablet, Take 10 mg by mouth in the morning., Disp: , Rfl:   •  hydrochlorothiazide (HYDRODIURIL) 12.5 mg tablet, Take 12.5 mg by mouth daily at bedtime, Disp: , Rfl:   •  LORazepam (ATIVAN) 0.5 mg tablet, Take 0.5 mg by mouth, Disp: , Rfl:   •  losartan (COZAAR) 50 mg tablet, Take 50 mg by mouth daily at bedtime, Disp: , Rfl:   •  metFORMIN (GLUCOPHAGE-XR) 500 mg 24 hr tablet, , Disp: , Rfl:   •  minoxidil (LONITEN) 2.5 mg tablet, Take 1.25 mg by mouth in the morning., Disp: , Rfl:   •  Multiple Vitamins-Minerals (Multivitamin Adults) TABS, Take by mouth, Disp: , Rfl:   •  Retin-A 0.05 % cream, , Disp: , Rfl:   •  simvastatin (ZOCOR) 20 mg tablet, Take 20 mg by mouth daily at bedtime, Disp: , Rfl:   •  zolpidem (AMBIEN CR) 12.5 MG CR tablet, Take 12.5 mg by mouth as needed, Disp: , Rfl:

## 2025-06-09 LAB
LAB AP GYN PRIMARY INTERPRETATION: NORMAL
Lab: NORMAL

## 2025-06-10 ENCOUNTER — RESULTS FOLLOW-UP (OUTPATIENT)
Dept: OBGYN CLINIC | Facility: CLINIC | Age: 70
End: 2025-06-10

## (undated) DEVICE — SURGICEL 4 X 8IN

## (undated) DEVICE — DRESSING PRISMA MATRIX 19.1IN X 19.1IN

## (undated) DEVICE — SYRINGE 10ML LL

## (undated) DEVICE — DRESSING OPTILOCK 6.5 X 10IN

## (undated) DEVICE — SUT MONOCRYL 5-0 PC-2 18 IN Y495G

## (undated) DEVICE — ELECTRODE NEEDLE MOD E-Z CLEAN 2.75IN 7CM -0013M

## (undated) DEVICE — PROBE GAMMA TRUNODE

## (undated) DEVICE — GAUZE SPONGES,16 PLY: Brand: CURITY

## (undated) DEVICE — CHLORAPREP HI-LITE 26ML ORANGE

## (undated) DEVICE — WET SKIN PREP TRAY: Brand: MEDLINE INDUSTRIES, INC.

## (undated) DEVICE — NEEDLE 25G X 1 1/2

## (undated) DEVICE — SPLINT 4 X 15 IN PRECUT SYNTHETIC

## (undated) DEVICE — NEEDLE 23G X 1 1/2 SAFETY-GLIDE THIN WALL

## (undated) DEVICE — PAD GROUNDING DUAL ADULT

## (undated) DEVICE — ADHESIVE SKIN HIGH VISCOSITY EXOFIN 1ML

## (undated) DEVICE — BETHLEHEM UNIVERSAL OUTPATIENT: Brand: CARDINAL HEALTH

## (undated) DEVICE — GLOVE SRG BIOGEL 6.5

## (undated) DEVICE — INTENDED FOR TISSUE SEPARATION, AND OTHER PROCEDURES THAT REQUIRE A SHARP SURGICAL BLADE TO PUNCTURE OR CUT.: Brand: BARD-PARKER SAFETY BLADES SIZE 15, STERILE

## (undated) DEVICE — SUT VICRYL 3-0 SH 27 IN J416H

## (undated) DEVICE — ANTIBACTERIAL UNDYED BRAIDED (POLYGLACTIN 910), SYNTHETIC ABSORBABLE SUTURE: Brand: COATED VICRYL

## (undated) DEVICE — SUT SILK 2-0 SH 30 IN K833H

## (undated) DEVICE — OCCLUSIVE GAUZE STRIP,3% BISMUTH TRIBROMOPHENATE IN PETROLATUM BLEND: Brand: XEROFORM

## (undated) DEVICE — SUT VICRYL 3-0 REEL 54 IN J285G

## (undated) DEVICE — SPECIMEN CONTAINER STERILE PEEL PACK

## (undated) DEVICE — PROVE COVER: Brand: UNBRANDED

## (undated) DEVICE — TUBING SUCTION 5MM X 12 FT

## (undated) DEVICE — ELECTRODE BLADE MOD E-Z CLEAN  2.75IN 7CM -0012AM

## (undated) DEVICE — PLUMEPEN PRO 10FT

## (undated) DEVICE — 3M™ STERI-STRIP™ REINFORCED ADHESIVE SKIN CLOSURES, R1547, 1/2 IN X 4 IN (12 MM X 100 MM), 6 STRIPS/ENVELOPE: Brand: 3M™ STERI-STRIP™

## (undated) DEVICE — DERMATOME BLADES: Brand: DERMATOME

## (undated) DEVICE — BULB SYRINGE,IRRIGATION WITH PROTECTIVE CAP: Brand: DOVER

## (undated) DEVICE — 3M™ TEGADERM™ CHG DRESSING 25/CARTON 4 CARTONS/CASE 1659: Brand: TEGADERM™

## (undated) DEVICE — SPONGE LAP 18 X 18 IN

## (undated) DEVICE — MEDI-VAC YANK SUCT HNDL W/TPRD BULBOUS TIP: Brand: CARDINAL HEALTH

## (undated) DEVICE — ELECTRODE BLADE MOD E-Z CLEAN 2.5IN 6.4CM -0012M

## (undated) DEVICE — BETHLEHEM UNIVERSAL MINOR GEN: Brand: CARDINAL HEALTH

## (undated) DEVICE — KERLIX BANDAGE ROLL: Brand: KERLIX

## (undated) DEVICE — PAD CAST 4 IN COTTON NON STERILE